# Patient Record
Sex: MALE | Race: WHITE | NOT HISPANIC OR LATINO | Employment: OTHER | URBAN - METROPOLITAN AREA
[De-identification: names, ages, dates, MRNs, and addresses within clinical notes are randomized per-mention and may not be internally consistent; named-entity substitution may affect disease eponyms.]

---

## 2017-03-22 ENCOUNTER — ALLSCRIPTS OFFICE VISIT (OUTPATIENT)
Dept: OTHER | Facility: OTHER | Age: 32
End: 2017-03-22

## 2017-03-22 DIAGNOSIS — T14.8XXA OTHER INJURY OF UNSPECIFIED BODY REGION: ICD-10-CM

## 2017-03-22 DIAGNOSIS — R53.83 OTHER FATIGUE: ICD-10-CM

## 2017-03-30 ENCOUNTER — LAB CONVERSION - ENCOUNTER (OUTPATIENT)
Dept: OTHER | Facility: OTHER | Age: 32
End: 2017-03-30

## 2017-03-30 ENCOUNTER — ALLSCRIPTS OFFICE VISIT (OUTPATIENT)
Dept: OTHER | Facility: OTHER | Age: 32
End: 2017-03-30

## 2017-03-30 LAB
25(OH)D3 SERPL-MCNC: 25 NG/ML (ref 30–100)
A/G RATIO (HISTORICAL): 1.1 (CALC) (ref 1–2.5)
ALBUMIN SERPL BCP-MCNC: 3.5 G/DL (ref 3.6–5.1)
ALP SERPL-CCNC: 50 U/L (ref 40–115)
ALT SERPL W P-5'-P-CCNC: 28 U/L (ref 9–46)
AST SERPL W P-5'-P-CCNC: 24 U/L (ref 10–40)
BASOPHILS # BLD AUTO: 0.2 %
BASOPHILS # BLD AUTO: 9 CELLS/UL (ref 0–200)
BILIRUB SERPL-MCNC: 0.4 MG/DL (ref 0.2–1.2)
BUN SERPL-MCNC: 21 MG/DL (ref 7–25)
BUN/CREA RATIO (HISTORICAL): 15 (CALC) (ref 6–22)
CALCIUM SERPL-MCNC: 8.4 MG/DL (ref 8.6–10.3)
CHLORIDE SERPL-SCNC: 101 MMOL/L (ref 98–110)
CK SERPL-CCNC: 95 U/L (ref 44–196)
CO2 SERPL-SCNC: 29 MMOL/L (ref 20–31)
CREAT SERPL-MCNC: 1.4 MG/DL (ref 0.6–1.35)
DEPRECATED RDW RBC AUTO: 14.9 % (ref 11–15)
EGFR AFRICAN AMERICAN (HISTORICAL): 76 ML/MIN/1.73M2
EGFR-AMERICAN CALC (HISTORICAL): 66 ML/MIN/1.73M2
EOSINOPHIL # BLD AUTO: 1.5 %
EOSINOPHIL # BLD AUTO: 68 CELLS/UL (ref 15–500)
GAMMA GLOBULIN (HISTORICAL): 3.1 G/DL (CALC) (ref 1.9–3.7)
GLUCOSE (HISTORICAL): 82 MG/DL (ref 65–99)
HCT VFR BLD AUTO: 53.8 % (ref 38.5–50)
HGB BLD-MCNC: 17.4 G/DL (ref 13.2–17.1)
LYMPHOCYTES # BLD AUTO: 1553 CELLS/UL (ref 850–3900)
LYMPHOCYTES # BLD AUTO: 34.5 %
MCH RBC QN AUTO: 31.4 PG (ref 27–33)
MCHC RBC AUTO-ENTMCNC: 32.3 G/DL (ref 32–36)
MCV RBC AUTO: 97.1 FL (ref 80–100)
MONOCYTES # BLD AUTO: 446 CELLS/UL (ref 200–950)
MONOCYTES (HISTORICAL): 9.9 %
NEUTROPHILS # BLD AUTO: 2426 CELLS/UL (ref 1500–7800)
NEUTROPHILS # BLD AUTO: 53.9 %
PLATELET # BLD AUTO: 250 THOUSAND/UL (ref 140–400)
PMV BLD AUTO: 8.1 FL (ref 7.5–12.5)
POTASSIUM SERPL-SCNC: 4.4 MMOL/L (ref 3.5–5.3)
RBC # BLD AUTO: 5.55 MILLION/UL (ref 4.2–5.8)
SODIUM SERPL-SCNC: 138 MMOL/L (ref 135–146)
TOTAL PROTEIN (HISTORICAL): 6.6 G/DL (ref 6.1–8.1)
TSH SERPL DL<=0.05 MIU/L-ACNC: 4.05 MIU/L (ref 0.4–4.5)
WBC # BLD AUTO: 4.5 THOUSAND/UL (ref 3.8–10.8)

## 2017-05-04 LAB
BUN SERPL-MCNC: 14 MG/DL (ref 7–25)
BUN/CREA RATIO (HISTORICAL): NORMAL (CALC) (ref 6–22)
CALCIUM SERPL-MCNC: 8.6 MG/DL (ref 8.6–10.3)
CHLORIDE SERPL-SCNC: 101 MMOL/L (ref 98–110)
CO2 SERPL-SCNC: 30 MMOL/L (ref 20–31)
CREAT SERPL-MCNC: 1.31 MG/DL (ref 0.6–1.35)
EGFR AFRICAN AMERICAN (HISTORICAL): 82 ML/MIN/1.73M2
EGFR-AMERICAN CALC (HISTORICAL): 71 ML/MIN/1.73M2
GLUCOSE (HISTORICAL): 79 MG/DL (ref 65–99)
POTASSIUM SERPL-SCNC: 4.3 MMOL/L (ref 3.5–5.3)
SODIUM SERPL-SCNC: 138 MMOL/L (ref 135–146)

## 2017-05-05 ENCOUNTER — LAB CONVERSION - ENCOUNTER (OUTPATIENT)
Dept: OTHER | Facility: OTHER | Age: 32
End: 2017-05-05

## 2017-05-05 LAB
25(OH)D3 SERPL-MCNC: 23 NG/ML (ref 30–100)
DEPRECATED RDW RBC AUTO: 14.7 % (ref 11–15)
HCT VFR BLD AUTO: 52 % (ref 38.5–50)
HGB BLD-MCNC: 17.7 G/DL (ref 13.2–17.1)
MCH RBC QN AUTO: 32 PG (ref 27–33)
MCHC RBC AUTO-ENTMCNC: 34 G/DL (ref 32–36)
MCV RBC AUTO: 94 FL (ref 80–100)
PLATELET # BLD AUTO: 250 THOUSAND/UL (ref 140–400)
PMV BLD AUTO: 8.5 FL (ref 7.5–12.5)
RBC # BLD AUTO: 5.53 MILLION/UL (ref 4.2–5.8)
WBC # BLD AUTO: 5 THOUSAND/UL (ref 3.8–10.8)

## 2017-05-08 LAB — ERYTHROPOIETIN (HISTORICAL): 8.8 MIU/ML (ref 2.6–18.5)

## 2017-05-19 ENCOUNTER — ALLSCRIPTS OFFICE VISIT (OUTPATIENT)
Dept: OTHER | Facility: OTHER | Age: 32
End: 2017-05-19

## 2017-06-12 ENCOUNTER — GENERIC CONVERSION - ENCOUNTER (OUTPATIENT)
Dept: OTHER | Facility: OTHER | Age: 32
End: 2017-06-12

## 2017-06-12 ENCOUNTER — ALLSCRIPTS OFFICE VISIT (OUTPATIENT)
Dept: OTHER | Facility: OTHER | Age: 32
End: 2017-06-12

## 2017-06-12 DIAGNOSIS — D75.1 SECONDARY POLYCYTHEMIA: ICD-10-CM

## 2017-06-17 LAB
CHOLEST SERPL-MCNC: 305 MG/DL (ref 125–200)
CHOLEST/HDLC SERPL: 6.8 (CALC)
HDLC SERPL-MCNC: 45 MG/DL
LDL CHOLESTEROL (HISTORICAL): 205 MG/DL (CALC)
NON-HDL-CHOL (CHOL-HDL) (HISTORICAL): 260 MG/DL (CALC)
TRIGL SERPL-MCNC: 273 MG/DL

## 2017-06-20 ENCOUNTER — LAB CONVERSION - ENCOUNTER (OUTPATIENT)
Dept: OTHER | Facility: OTHER | Age: 32
End: 2017-06-20

## 2017-06-20 LAB — METHYLMALONIC ACID (HISTORICAL): 281 NMOL/L (ref 87–318)

## 2017-06-21 ENCOUNTER — LAB CONVERSION - ENCOUNTER (OUTPATIENT)
Dept: OTHER | Facility: OTHER | Age: 32
End: 2017-06-21

## 2017-06-21 LAB
INTERPRETATION (HISTORICAL): NORMAL
INTERPRETATION (HISTORICAL): NORMAL
JAK2 EXON 12 MUTATION (HISTORICAL): NOT DETECTED
JAK2 EXON 13 MUTATION (HISTORICAL): NOT DETECTED
JAK2 V617F (HISTORICAL): NOT DETECTED
METHYLMALONIC ACID (HISTORICAL): 281 NMOL/L (ref 87–318)
SOURCE (HISTORICAL): NORMAL

## 2017-06-23 ENCOUNTER — LAB CONVERSION - ENCOUNTER (OUTPATIENT)
Dept: OTHER | Facility: OTHER | Age: 32
End: 2017-06-23

## 2017-06-23 LAB
A/G RATIO (HISTORICAL): 1.2 (CALC) (ref 1–2.5)
ALBUMIN SERPL BCP-MCNC: 3.4 G/DL (ref 3.6–5.1)
ALP SERPL-CCNC: 45 U/L (ref 40–115)
ALT SERPL W P-5'-P-CCNC: 18 U/L (ref 9–46)
AST SERPL W P-5'-P-CCNC: 17 U/L (ref 10–40)
BASOPHILS # BLD AUTO: 0.5 %
BASOPHILS # BLD AUTO: 28 CELLS/UL (ref 0–200)
BILIRUB SERPL-MCNC: 0.4 MG/DL (ref 0.2–1.2)
BUN SERPL-MCNC: 16 MG/DL (ref 7–25)
BUN/CREA RATIO (HISTORICAL): ABNORMAL (CALC) (ref 6–22)
CALCIUM SERPL-MCNC: 8.4 MG/DL (ref 8.6–10.3)
CHLORIDE SERPL-SCNC: 103 MMOL/L (ref 98–110)
CO2 SERPL-SCNC: 29 MMOL/L (ref 20–31)
CREAT SERPL-MCNC: 1.29 MG/DL (ref 0.6–1.35)
DEPRECATED RDW RBC AUTO: 15.1 % (ref 11–15)
EGFR AFRICAN AMERICAN (HISTORICAL): 84 ML/MIN/1.73M2
EGFR-AMERICAN CALC (HISTORICAL): 72 ML/MIN/1.73M2
EOSINOPHIL # BLD AUTO: 1.8 %
EOSINOPHIL # BLD AUTO: 99 CELLS/UL (ref 15–500)
GAMMA GLOBULIN (HISTORICAL): 2.9 G/DL (CALC) (ref 1.9–3.7)
GLUCOSE (HISTORICAL): 82 MG/DL (ref 65–99)
HCT VFR BLD AUTO: 53.3 % (ref 38.5–50)
HGB BLD-MCNC: 17 G/DL (ref 13.2–17.1)
INTERPRETATION (HISTORICAL): NORMAL
INTERPRETATION (HISTORICAL): NORMAL
JAK2 EXON 12 MUTATION (HISTORICAL): NOT DETECTED
JAK2 EXON 13 MUTATION (HISTORICAL): NOT DETECTED
JAK2 V617F (HISTORICAL): NOT DETECTED
LYMPHOCYTES # BLD AUTO: 1480 CELLS/UL (ref 850–3900)
LYMPHOCYTES # BLD AUTO: 26.9 %
Lab: NOT DETECTED
Lab: NOT DETECTED
MCH RBC QN AUTO: 31.8 PG (ref 27–33)
MCHC RBC AUTO-ENTMCNC: 31.9 G/DL (ref 32–36)
MCV RBC AUTO: 99.5 FL (ref 80–100)
METHYLMALONIC ACID (HISTORICAL): 281 NMOL/L (ref 87–318)
MONOCYTES # BLD AUTO: 567 CELLS/UL (ref 200–950)
MONOCYTES (HISTORICAL): 10.3 %
NEUTROPHILS # BLD AUTO: 3328 CELLS/UL (ref 1500–7800)
NEUTROPHILS # BLD AUTO: 60.5 %
PLATELET # BLD AUTO: 245 THOUSAND/UL (ref 140–400)
PMV BLD AUTO: 8.3 FL (ref 7.5–12.5)
POTASSIUM SERPL-SCNC: 4.4 MMOL/L (ref 3.5–5.3)
RBC # BLD AUTO: 5.36 MILLION/UL (ref 4.2–5.8)
REVIEWED BY (HISTORICAL): NORMAL
SODIUM SERPL-SCNC: 139 MMOL/L (ref 135–146)
SOURCE (HISTORICAL): NORMAL
SOURCE (HISTORICAL): NORMAL
TOTAL PROTEIN (HISTORICAL): 6.3 G/DL (ref 6.1–8.1)
WBC # BLD AUTO: 5.5 THOUSAND/UL (ref 3.8–10.8)

## 2017-06-27 ENCOUNTER — HOSPITAL ENCOUNTER (OUTPATIENT)
Dept: ULTRASOUND IMAGING | Facility: HOSPITAL | Age: 32
Discharge: HOME | End: 2017-06-27
Payer: COMMERCIAL

## 2017-06-27 PROCEDURE — 76700 US EXAM ABDOM COMPLETE: CPT

## 2017-07-11 ENCOUNTER — ALLSCRIPTS OFFICE VISIT (OUTPATIENT)
Dept: OTHER | Facility: OTHER | Age: 32
End: 2017-07-11

## 2017-07-13 ENCOUNTER — TRANSCRIBE ORDERS (OUTPATIENT)
Dept: ADMINISTRATIVE | Facility: HOSPITAL | Age: 32
End: 2017-07-13

## 2017-07-13 ENCOUNTER — ALLSCRIPTS OFFICE VISIT (OUTPATIENT)
Dept: OTHER | Facility: OTHER | Age: 32
End: 2017-07-13

## 2017-07-13 DIAGNOSIS — R41.89 OTHER SYMPTOMS AND SIGNS INVOLVING COGNITIVE FUNCTIONS AND AWARENESS: ICD-10-CM

## 2017-07-13 DIAGNOSIS — R41.89 AKINETIC MUTISM: Primary | ICD-10-CM

## 2017-08-02 ENCOUNTER — HOSPITAL ENCOUNTER (OUTPATIENT)
Dept: MRI IMAGING | Facility: HOSPITAL | Age: 32
Discharge: HOME | End: 2017-08-02
Attending: PSYCHIATRY & NEUROLOGY
Payer: COMMERCIAL

## 2017-08-02 DIAGNOSIS — R41.89 AKINETIC MUTISM: ICD-10-CM

## 2017-08-02 PROCEDURE — 70551 MRI BRAIN STEM W/O DYE: CPT

## 2017-08-03 ENCOUNTER — GENERIC CONVERSION - ENCOUNTER (OUTPATIENT)
Dept: OTHER | Facility: OTHER | Age: 32
End: 2017-08-03

## 2017-08-03 ENCOUNTER — LAB CONVERSION - ENCOUNTER (OUTPATIENT)
Dept: OTHER | Facility: OTHER | Age: 32
End: 2017-08-03

## 2017-08-03 LAB
FOLATE SERPL-MCNC: 20.1 NG/ML
RPR SCREEN (HISTORICAL): NORMAL
T4 FREE SERPL-MCNC: 1.2 NG/DL (ref 0.8–1.8)
TSH SERPL DL<=0.05 MIU/L-ACNC: 2.25 MIU/L (ref 0.4–4.5)
VIT B12 SERPL-MCNC: 420 PG/ML (ref 200–1100)

## 2017-08-22 ENCOUNTER — ALLSCRIPTS OFFICE VISIT (OUTPATIENT)
Dept: OTHER | Facility: OTHER | Age: 32
End: 2017-08-22

## 2017-08-24 ENCOUNTER — ALLSCRIPTS OFFICE VISIT (OUTPATIENT)
Dept: OTHER | Facility: OTHER | Age: 32
End: 2017-08-24

## 2017-08-25 ENCOUNTER — GENERIC CONVERSION - ENCOUNTER (OUTPATIENT)
Dept: OTHER | Facility: OTHER | Age: 32
End: 2017-08-25

## 2017-11-07 ENCOUNTER — ALLSCRIPTS OFFICE VISIT (OUTPATIENT)
Dept: OTHER | Facility: OTHER | Age: 32
End: 2017-11-07

## 2017-12-12 ENCOUNTER — GENERIC CONVERSION - ENCOUNTER (OUTPATIENT)
Dept: OTHER | Facility: OTHER | Age: 32
End: 2017-12-12

## 2018-01-10 NOTE — RESULT NOTES
Verified Results  * MRI BRAIN WO CONTRAST [id number] 04:30PM Jose Tapia     Test Name Result Flag Reference   MRI BRAIN WO CONTRAST (Report)     This is a summary report  The complete report is available in the patient's medical record  If you cannot access the medical record, please contact the sending organization for a detailed fax or copy  MRI BRAIN WITHOUT CONTRAST, [facility]     INDICATION: History of Down syndrome  Memory loss  Early signs of dementia  COMPARISON:  None  TECHNIQUE: Sagittal T1, axial T2, axial [place], axial T1, axial FLAIR, axial diffusion imaging  Coronal T2  Sagittal 3D volumetric imaging processed by [facility] Related Atrophy reports  IMAGE QUALITY: Diagnostic  FINDINGS:     BRAIN PARENCHYMA: No mass, mass effect or midline shift  White matter hyperintensities are seen within the frontal lobes, right greater than left on T2 and FLAIR imaging  No associated diffusion abnormality, mass or hemorrhage  Normal basilar    cisterns  Brainstem and cerebellum demonstrate normal signal       QUANTITATIVE:      Exam Quality: Adequate for volumetric analysis  Hippocampus     Total hippocampal volume (cc):  7 69    Percentile for similar age:   81th      Lateral ventricular volume (cc):   26 52    Percentile for similar age:   82th      Inferior lateral vent volume (cc):  1 75    Percentile for similar age:   52th        Quantitative conclusions:      Hippocampi:             Normal volume      Lateral Ventricle Volume:   Normal Volume     Temporal Horn Volume:    Normal Volume     Concordance between qualitative and quantitative hippocampal volume assessment: Concordant  Change in brain volumes: No previous volumetric study for comparison     Mean hippocampal volume loss among normal elderly: 0 7% per year, (-0 3 to 1 7; Hollie 2008; also Emeka 2010)           VENTRICLES: The ventricles are normal in size and contour  SELLA AND PITUITARY GLAND: Normal      ORBITS: Normal      PARANASAL SINUSES: Mild mucosal thickening of the paranasal sinuses  VASCULATURE: Evaluation of the major intracranial vasculature demonstrates appropriate flow voids  CALVARIUM AND SKULL BASE: Normal      EXTRACRANIAL SOFT TISSUES: Normal            IMPRESSION:     1  White matter changes within the cerebral hemispheres, right greater than left may represent chronic microangiopathic change, somewhat atypical for a patient of this age  No acute ischemia mass or hemorrhage  2  NeuroQuant analysis was performed: Normal study; Does not support neurodegeneration         Workstation performed: [id number]     Signed by:   Sonny Amaya DO   8/3/17     (1) T4, FREE [id number] 09:21AM [facility]     Test Name Result Flag Reference   T4, FREE 1 2 ng/dL  0 8-1 8     (Q) TSH, 3RD GENERATION [id number] 09:21AM [facility]     Test Name Result Flag Reference   TSH 2 25 mIU/L  0 40-4 50     (Q) VITAMIN B12/FOLATE, SERUM PANEL [id number] 09:21AM [facility]   REPORT COMMENT:  FASTING:YES     Test Name Result Flag Reference   VITAMIN B12 420 pg/mL  200-1100   FOLATE, SERUM 20 1 ng/mL     Reference Range                             Low:           <3 4                             Borderline:    3 4-5 4                             Normal:        >5 4

## 2018-01-12 VITALS
HEIGHT: 61 IN | TEMPERATURE: 97 F | DIASTOLIC BLOOD PRESSURE: 62 MMHG | WEIGHT: 206.5 LBS | RESPIRATION RATE: 16 BRPM | HEART RATE: 77 BPM | SYSTOLIC BLOOD PRESSURE: 112 MMHG | BODY MASS INDEX: 38.99 KG/M2 | OXYGEN SATURATION: 97 %

## 2018-01-12 NOTE — PROGRESS NOTES
Assessment    1  Encounter for preventive health examination (V70 0) (Z00 00)    Discussion/Summary  Impression: health maintenance visit  Currently, he eats a healthy diet and has an adequate exercise regimen  Patient discussion: discussed with the patient  34 yo M who is doing well  Immunizations UTD  Vitals stable  Forms completed  RTC in 6-12 months  The patient, patient's caretaker was counseled regarding impressions  Chief Complaint  No complaints  HM visit  History of Present Illness  HM, Adult Male: The patient is being seen for a health maintenance evaluation  The last health maintenance visit was 1 year(s) ago  General Health: The patient's health since the last visit is described as good  He has regular dental visits  He complains of vision problems  Vision care includes wearing glasses  He denies hearing loss  Immunizations status: up to date  Lifestyle:  He consumes a diverse and healthy diet  He has weight concerns  He exercises regularly (treadmill)  He exercises 3 or more times per week  Exercise includes walking and running/jogging  He does not use tobacco  He consumes alcohol  He reports occasional alcohol use  He denies drug use  Reproductive health:  the patient is not sexually active  Screening: cancer screening reviewed and current  Colorectal cancer screening includes no previous screening  metabolic screening reviewed and current  Metabolic screening includes lipid profile performed within the past five years, glucose screening performed last year and thyroid function test performed last year  risk screening reviewed and current  Cardiovascular risk factors: high LDL cholesterol and obesity, but no hypertension, no diabetes, no tobacco use, no illicit drug use and no sedentary lifestyle  Safety elements used: seat belt, smoke detector, carbon monoxide detector and CPR training for household members  Risk findings: no passive smoke exposure     HPI: 34 yo M with cognitive impairment who presents for  with caretaker  Denies any concerns  Has recently been evaluated by the Neurologist for possible early onset Alzheimers and by the Hematologist for polycythemia likely secondary to his snoring  No HA/CP/SOB/Abd pain/N/V/D  Review of Systems    Constitutional: no fever, not feeling poorly and no chills  ENT: no earache  Cardiovascular: no chest pain  Respiratory: no shortness of breath and no cough  Gastrointestinal: no abdominal pain, no nausea, no vomiting and no blood in stools  Genitourinary: no dysuria  Integumentary: no dry skin  Neurological: no headache  Psychiatric: not suicidal, no anxiety and no depression  Active Problems     1  Alzheimer's disease, early onset (331 0) (G30 0,F02 80)   2  Dry skin dermatitis (692 89) (L85 3)   3  GERD without esophagitis (530 81) (K21 9)   4  HLD (hyperlipidemia) (272 4) (E78 5)   5  Intellectual functioning disability (319) (F78)   6  Muscle strain (848 9) (T14 8)   7  Need for Tdap vaccination (V06 1) (Z23)   8  Obesity (278 00) (E66 9)   9  Pain in both lower extremities (729 5) (M79 604,M79 605)   10  Secondary polycythemia (289 0) (D75 1)   11   Vitamin D deficiency (268 9) (E55 9)    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)       Moderate intellectual disabilities (318 0) (F71)       Cognitive impairment (294 9) (R41 89)          Past Medical History    · History of Acute bacterial rhinosinusitis (461 9) (J01 90,B96 89)   · Denied: History of Atlanto-axial instability   · History of allergic rhinitis (V12 69) (Z87 09)   · History of fatigue (V13 89) (Z87 898)   · History of folliculitis (V84 1) (L01 2)   · History of pneumonia (V12 61) (Z87 01)    Family History  Mother    · Family history of Early onset Alzheimer's dementia without behavioral disturbance  Father    · Family history of diabetes mellitus (V18 0) (Z83 3)   · Family history of essential hypertension (V17 49) (Z82 49)    Social History    · Does not use illicit drugs (Z94 56) (Z78 9)   · Never a smoker   · No alcohol use   · On disability    Current Meds   1  Atorvastatin Calcium 40 MG Oral Tablet; TAKE 1 TABLET AT BEDTIME; Therapy: 26MHB6724 to (Evaluate:95Erm4373)  Requested for: 20Jun2017; Last   Rx:20Jun2017 Ordered   2  LORazepam 1 MG Oral Tablet; TAKE 1 TABLET 1 HOUR BEFORE MRI  MAY REPEAT   ONCE 15 MINUTES BEFORE MRI; Therapy: 78LHS2498 to (Evaluate:14Jul2017); Last Rx:13Jul2017 Ordered   3  Vitamin D (Ergocalciferol) 30838 UNIT Oral Capsule; TAKE 1 CAPSULE WEEKLY; Therapy: 14NMY4033 to (Last Rx:04Kda3108)  Requested for: 04VDU7613 Ordered   4  ZyrTEC Allergy 10 MG Oral Tablet; TAKE 1 TABLET AT BEDTIME; Therapy: 64HYU9663 to (Evaluate:19Jan2017)  Requested for: 96Kjz7391; Last   Rx:91Sle6623 Ordered    Allergies    1  No Known Drug Allergies    2  Seasonal    Vitals   Recorded: 22Aug2017 01:16PM   Temperature 97 6 F, Tympanic   Heart Rate 80   Respiration 14   Systolic 157, LUE, Sitting   Diastolic 80, LUE, Sitting   BP CUFF SIZE Large   Height 5 ft 1 8 in   Weight 206 lb 2 oz   BMI Calculated 37 95   BSA Calculated 1 93   Pain Scale 0     Physical Exam    Constitutional   General appearance: No acute distress, well appearing and well nourished  Head and Face   Head and face: Normal     Eyes   Conjunctiva and lids: No erythema, swelling or discharge  Pupils and irises: Equal, round, reactive to light  Ears, Nose, Mouth, and Throat   External inspection of ears and nose: Normal     Otoscopic examination: Tympanic membranes translucent with normal light reflex  Canals patent without erythema  Hearing: Normal     Lips, teeth, and gums: Normal, good dentition  Oropharynx: Normal with no erythema, edema, exudate or lesions  Neck   Neck: Supple, symmetric, trachea midline, no masses  Thyroid: Normal, no thyromegaly      Pulmonary   Respiratory effort: No increased work of breathing or signs of respiratory distress  Auscultation of lungs: Clear to auscultation  Cardiovascular   Auscultation of heart: Normal rate and rhythm, normal S1 and S2, no murmurs  Abdomen   Abdomen: Non-tender, no masses  Liver and spleen: No hepatomegaly or splenomegaly  Lymphatic   Palpation of lymph nodes in neck: No lymphadenopathy  Musculoskeletal   Gait and station: Normal     Stability: Normal     Muscle strength/tone: Normal     Psychiatric   Judgment and insight: Normal     Orientation to person, place and time: Normal     Recent and remote memory: Intact  Mood and affect: Normal        Attending Note  Attending Note: Attending Note: I discussed the case with the Resident and reviewed the Resident's note  Level of Participation: I was present in clinic, but did not examine the patient  I agree with the Resident's note except Down syndrome/ID  Future Appointments    Date/Time Provider Specialty Site   11/07/2017 03:20 PM JULIO CESAR Mazariegos  [address] [place] [address]   08/23/2018 04:00 PM JULIO CESAR Mcgraw   [facility]     Signatures   Electronically signed by : JULIO CESAR Grier ; Aug 22 2017  2:45PM EST                       (Author)    Electronically signed by : JULIO CESAR Carty ; Aug 28 2017  3:08PM EST                       (Review)

## 2018-01-13 VITALS
HEIGHT: 62 IN | HEART RATE: 80 BPM | BODY MASS INDEX: 37.93 KG/M2 | SYSTOLIC BLOOD PRESSURE: 124 MMHG | WEIGHT: 206.13 LBS | DIASTOLIC BLOOD PRESSURE: 80 MMHG | RESPIRATION RATE: 14 BRPM | TEMPERATURE: 97.6 F

## 2018-01-13 VITALS
DIASTOLIC BLOOD PRESSURE: 80 MMHG | HEIGHT: 61 IN | SYSTOLIC BLOOD PRESSURE: 124 MMHG | WEIGHT: 200.13 LBS | HEART RATE: 80 BPM | RESPIRATION RATE: 16 BRPM | TEMPERATURE: 97.8 F | BODY MASS INDEX: 37.79 KG/M2

## 2018-01-13 VITALS
BODY MASS INDEX: 37.54 KG/M2 | RESPIRATION RATE: 14 BRPM | DIASTOLIC BLOOD PRESSURE: 76 MMHG | WEIGHT: 204 LBS | HEIGHT: 62 IN | SYSTOLIC BLOOD PRESSURE: 120 MMHG | HEART RATE: 78 BPM | TEMPERATURE: 98.4 F

## 2018-01-13 VITALS
RESPIRATION RATE: 18 BRPM | TEMPERATURE: 96.5 F | SYSTOLIC BLOOD PRESSURE: 140 MMHG | BODY MASS INDEX: 39.46 KG/M2 | HEART RATE: 80 BPM | HEIGHT: 61 IN | OXYGEN SATURATION: 98 % | WEIGHT: 209 LBS | DIASTOLIC BLOOD PRESSURE: 86 MMHG

## 2018-01-13 NOTE — MISCELLANEOUS
To Whom it May Concern,    Dinorah Vieira 1985 is a patient under the care of [facility]  Mr Marlene Fischer is highly functional but after neurologic evaluation and cognitive deterioration due to his underlying condition, I would recommend he attend a workshop and/or  volunteer up to 4 hours a day as tolerated  Engagement in cognitive activities will benefit him long term and help in maintaining his cognitive function  For any further questions please contact our office at [phone]  Sincerely,    JULIO CESAR Elise  Electronically signed by:Yin ROSARIO    Aug 25 2017  1:07PM EST Author

## 2018-01-14 VITALS
SYSTOLIC BLOOD PRESSURE: 142 MMHG | DIASTOLIC BLOOD PRESSURE: 88 MMHG | BODY MASS INDEX: 39.37 KG/M2 | HEART RATE: 60 BPM | RESPIRATION RATE: 16 BRPM | WEIGHT: 207 LBS

## 2018-01-15 VITALS
WEIGHT: 206 LBS | DIASTOLIC BLOOD PRESSURE: 74 MMHG | SYSTOLIC BLOOD PRESSURE: 102 MMHG | BODY MASS INDEX: 38.89 KG/M2 | HEIGHT: 61 IN | RESPIRATION RATE: 16 BRPM | TEMPERATURE: 97.9 F | HEART RATE: 72 BPM

## 2018-01-15 NOTE — RESULT NOTES
Verified Results  (1) T4, FREE 02Aug2017 09:21AM [facility]     Test Name Result Flag Reference   T4, FREE 1 2 ng/dL  0 8-1 8     (Q) TSH, 3RD GENERATION 02Aug2017 09:21AM [facility]     Test Name Result Flag Reference   TSH 2 25 mIU/L  0 40-4 50     (Q) VITAMIN B12/FOLATE, SERUM PANEL 02Aug2017 09:21AM [facility]     Test Name Result Flag Reference   VITAMIN B12 420 pg/mL  200-1100   FOLATE, SERUM 20 1 ng/mL     Reference Range                             Low:           <3 4                             Borderline:    3 4-5 4                             Normal:        >5 4     (Q) RPR (DX) W/REFL TITER AND CONFIRMATORY TESTING 02Aug2017 09:21AM [facility]   REPORT COMMENT:  FASTING:YES     Test Name Result Flag Reference   RPR (DX) W/REFL TITER AND$CONFIRMATORY TESTING NON-REACTIVE  NON-REACTIVE

## 2018-01-18 NOTE — MISCELLANEOUS
Provider Comments  Provider Comments:   pt was a no show today      Signatures   Electronically signed by : Patti Weaver, ; Nov 7 2017  3:36PM EST                       (Author)

## 2018-01-24 VITALS
BODY MASS INDEX: 39.88 KG/M2 | SYSTOLIC BLOOD PRESSURE: 118 MMHG | HEIGHT: 61 IN | HEART RATE: 74 BPM | RESPIRATION RATE: 16 BRPM | DIASTOLIC BLOOD PRESSURE: 72 MMHG | TEMPERATURE: 97.8 F | WEIGHT: 211.25 LBS

## 2018-08-21 ENCOUNTER — OFFICE VISIT (OUTPATIENT)
Dept: FAMILY MEDICINE CLINIC | Facility: CLINIC | Age: 33
End: 2018-08-21
Payer: COMMERCIAL

## 2018-08-21 VITALS
BODY MASS INDEX: 38.93 KG/M2 | SYSTOLIC BLOOD PRESSURE: 116 MMHG | WEIGHT: 206.2 LBS | TEMPERATURE: 97.2 F | RESPIRATION RATE: 16 BRPM | HEART RATE: 80 BPM | DIASTOLIC BLOOD PRESSURE: 78 MMHG | HEIGHT: 61 IN

## 2018-08-21 DIAGNOSIS — E66.9 CLASS 2 OBESITY WITHOUT SERIOUS COMORBIDITY WITH BODY MASS INDEX (BMI) OF 39.0 TO 39.9 IN ADULT, UNSPECIFIED OBESITY TYPE: ICD-10-CM

## 2018-08-21 DIAGNOSIS — E78.49 OTHER HYPERLIPIDEMIA: ICD-10-CM

## 2018-08-21 DIAGNOSIS — Z11.1 PPD SCREENING TEST: Primary | ICD-10-CM

## 2018-08-21 PROCEDURE — 3725F SCREEN DEPRESSION PERFORMED: CPT | Performed by: FAMILY MEDICINE

## 2018-08-21 PROCEDURE — 3008F BODY MASS INDEX DOCD: CPT | Performed by: FAMILY MEDICINE

## 2018-08-21 PROCEDURE — 99213 OFFICE O/P EST LOW 20 MIN: CPT | Performed by: FAMILY MEDICINE

## 2018-08-21 PROCEDURE — 86580 TB INTRADERMAL TEST: CPT | Performed by: FAMILY MEDICINE

## 2018-08-21 RX ORDER — FLUTICASONE PROPIONATE 50 MCG
1 SPRAY, SUSPENSION (ML) NASAL DAILY
COMMUNITY

## 2018-08-21 RX ORDER — ATORVASTATIN CALCIUM 40 MG/1
1 TABLET, FILM COATED ORAL
COMMUNITY
Start: 2017-06-20 | End: 2019-06-25 | Stop reason: ALTCHOICE

## 2018-08-21 NOTE — PROGRESS NOTES
Assessment/Plan:    Problem List Items Addressed This Visit        Other    Obesity     Diet and exercise discussed  Will check HgA1c, TSH         Relevant Orders    Comprehensive metabolic panel    Hemoglobin A1C    TSH, 3rd generation with Free T4 reflex    Other hyperlipidemia     Continue atorvastatin 40 mg daily           Relevant Medications    atorvastatin (LIPITOR) 40 mg tablet    Other Relevant Orders    Lipid panel      Other Visit Diagnoses     PPD screening test    -  Primary    Relevant Orders    TB Skin Test (Completed)          Subjective:      Patient ID: Dano Gatica is a 27 y o  male  58-year-old male with moderate intellectual disability,  Trisomy 24, hyperlipidemia here for  Annual exam   Per caretakers no complaints today  Taking all the medications as directed  Denies any shortness of breath, chest pain, palpitations, falls, seizures  a no family history of cancers  The following portions of the patient's history were reviewed and updated as appropriate: allergies, current medications, past family history, past medical history, past social history, past surgical history and problem list     Review of Systems   Constitutional: Negative for appetite change, fever and unexpected weight change  Eyes: Negative for visual disturbance  Respiratory: Negative for chest tightness  Cardiovascular: Negative for chest pain and palpitations  Gastrointestinal: Negative for abdominal pain and vomiting  Musculoskeletal: Negative for arthralgias  Neurological: Negative for dizziness and numbness  Hematological: Negative for adenopathy  Psychiatric/Behavioral: Negative for confusion  Objective:    Vitals:    08/21/18 1534   BP: 116/78   Pulse: 80   Resp: 16   Temp: (!) 97 2 °F (36 2 °C)        Physical Exam   Constitutional: He is oriented to person, place, and time  He appears well-developed and well-nourished  No distress  HENT:   Head: Normocephalic     Mouth/Throat: Oropharynx is clear and moist  No oropharyngeal exudate  Eyes: Conjunctivae are normal  Pupils are equal, round, and reactive to light  Neck: Normal range of motion  Cardiovascular: Normal rate, regular rhythm, normal heart sounds and intact distal pulses  Exam reveals no gallop and no friction rub  No murmur heard  Pulmonary/Chest: Effort normal and breath sounds normal  No respiratory distress  He has no wheezes  He has no rales  He exhibits no tenderness  Abdominal: Soft  He exhibits no distension and no mass  There is no tenderness  There is no rebound and no guarding  Musculoskeletal: Normal range of motion  He exhibits no edema, tenderness or deformity  Lymphadenopathy:     He has no cervical adenopathy  Neurological: He is alert and oriented to person, place, and time  Skin: Skin is warm and dry  No rash noted  He is not diaphoretic  No pallor  Psychiatric: He has a normal mood and affect  Vitals reviewed

## 2018-08-21 NOTE — ASSESSMENT & PLAN NOTE
Wt Readings from Last 3 Encounters:   08/21/18 93 5 kg (206 lb 3 2 oz)   12/12/17 95 8 kg (211 lb 4 oz)   08/22/17 93 5 kg (206 lb 2 1 oz)   Diet and exercise discussed  Will check HgA1c, TSH

## 2018-08-23 ENCOUNTER — CLINICAL SUPPORT (OUTPATIENT)
Dept: FAMILY MEDICINE CLINIC | Facility: CLINIC | Age: 33
End: 2018-08-23

## 2018-08-23 ENCOUNTER — OFFICE VISIT (OUTPATIENT)
Dept: NEUROLOGY | Facility: CLINIC | Age: 33
End: 2018-08-23
Payer: COMMERCIAL

## 2018-08-23 VITALS
WEIGHT: 203 LBS | RESPIRATION RATE: 14 BRPM | SYSTOLIC BLOOD PRESSURE: 112 MMHG | BODY MASS INDEX: 38.48 KG/M2 | HEART RATE: 74 BPM | DIASTOLIC BLOOD PRESSURE: 72 MMHG

## 2018-08-23 DIAGNOSIS — F79 INTELLECTUAL FUNCTIONING DISABILITY: Primary | ICD-10-CM

## 2018-08-23 DIAGNOSIS — Z11.1 PPD SCREENING TEST: Primary | ICD-10-CM

## 2018-08-23 DIAGNOSIS — Q90.9 COMPLETE TRISOMY 21 SYNDROME: ICD-10-CM

## 2018-08-23 LAB
INDURATION: 0 MM
TB SKIN TEST: NEGATIVE

## 2018-08-23 PROCEDURE — 99213 OFFICE O/P EST LOW 20 MIN: CPT | Performed by: PSYCHIATRY & NEUROLOGY

## 2018-08-23 NOTE — PROGRESS NOTES
Patient ID: Fran Moreira is a 27 y o  male  Assessment/Plan:     Problem List Items Addressed This Visit        Other    Complete trisomy 21 syndrome    Intellectual functioning disability - Primary         Mr  Jaskaran Lopez has presented for follow up on intellectual and cognitive dysfunction with no new focal neurologic deficit has been described  Patient has no progression of his cognitive difficulties weakness - we repeated MMSE and he scored 23/30 with 3/3 words recalled  Patient has been socially engaged and he is to start a new job soon with [employer] has received already  We discussed that patient would benefit from more social activities and he has no deterioration in his cognitive function  May consider cognitive therapy if patient wishes, but no deterioration noted  No new focal neurologic deficit has been described  Follow up on annual matter  Subjective: cognitive dysfunction    HPI/History of Present Illness  Mr  Jaskaran Lopez has a history of complete trisomy 21 syndrome, moderate intellectual disability, HLD, Obesity,  secondary polycythemia, vitamin D deficiency, who presented with his caregiver for follow up on cognitive dysfunction  Patient  has been socially active, he completed reading his book and he is planning to start a new one  Patient caregiver looking the job for [employer]; No new weakness or numbness, no falls, no infections, no balance issues described since last office visit  No lower back or neck pain,  no headaches  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; he is a good , and now 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  MRI brain 8/02/2017: White matter changes within the cerebral hemispheres, right greater than left may represent chronic microangiopathic change, somewhat atypical for a patient of this age  No acute ischemia mass or hemorrhage  NeuroQuant analysis was performed: Normal study;  Does not support neurodegeneration        The following portions of the patient's history were reviewed and updated as appropriate:   He  has a past medical history of Asthma and Spinal pain  He   Patient Active Problem List    Diagnosis Date Noted    Other hyperlipidemia 08/21/2018    Intellectual functioning disability 09/21/2016    Complete trisomy 21 syndrome 03/10/2016    Obesity 03/10/2016     He  has no past surgical history on file  His family history is not on file  He  reports that he has never smoked  He has never used smokeless tobacco  He reports that he drinks alcohol  He reports that he does not use drugs  Current Outpatient Prescriptions   Medication Sig Dispense Refill    atorvastatin (LIPITOR) 40 mg tablet Take 1 tablet by mouth      Cetirizine HCl (ZYRTEC ALLERGY) 10 MG CAPS Take 1 tablet by mouth      fluticasone (FLONASE) 50 mcg/act nasal spray 1 spray into each nostril daily      ibuprofen (MOTRIN) 600 mg tablet Take 1 tablet (600 mg total) by mouth every 6 (six) hours as needed for mild pain  30 tablet 0     No current facility-administered medications for this visit  Current Outpatient Prescriptions on File Prior to Visit   Medication Sig    atorvastatin (LIPITOR) 40 mg tablet Take 1 tablet by mouth    Cetirizine HCl (ZYRTEC ALLERGY) 10 MG CAPS Take 1 tablet by mouth    fluticasone (FLONASE) 50 mcg/act nasal spray 1 spray into each nostril daily    ibuprofen (MOTRIN) 600 mg tablet Take 1 tablet (600 mg total) by mouth every 6 (six) hours as needed for mild pain  No current facility-administered medications on file prior to visit  He has No Known Allergies            Objective:    Blood pressure 112/72, pulse 74, resp  rate 14, weight 92 1 kg (203 lb)  Physical Exam/Neurological Exam  CONSTITUTIONAL: NAD, pleasant  NECK: supple, no lymphadenopathy, no thyromegaly, no JVD  CARDIOVASCULAR: RRR, normal S1S2, no murmurs, no rubs  RESP: clear to auscultation bilaterally, no wheezes/rhonchi/rales   ABDOMEN: soft, non tender, non distended  SKIN: no rash or skin lesions  EXTREMITIES: no edema, pulses 2+bilaterally  PSYCH: appropriate mood and affect  NEUROLOGIC COMPREHENSIVE EXAM: Patient is oriented to person, place and time, NAD; appropriate affect  CN II, III, IV, V, VI, VII,VIII,IX,X,XI-XII intact with EOMI, low set ears, left eye ambliopia; PERRLA, OKN intact, VF grossly intact, fundi poorly visualized secondary to pupillary constriction; symmetric face noted  Motor: 5/5 UE/LE bilateral symmetric; Sensory: intact to light touch and pinprick bilaterally; normal vibration sensation feet bilaterally; Coordination within normal limits on FTN and RACQUEL testing; DTR: 2/4 through, no Babinski, no clonus  Tandem gait is Abnormal  Romberg: negative  ROS:  12 points of review of system was reviewed with the patient and was unremarkable with exception: see HPI  Review of Systems   Constitutional: Negative  HENT: Negative  Eyes: Positive for redness  Respiratory: Negative  Cardiovascular: Negative  Gastrointestinal: Negative  Endocrine: Negative  Genitourinary: Negative  Musculoskeletal: Negative  Skin: Negative  Allergic/Immunologic: Negative  Neurological: Negative  Hematological: Negative  Psychiatric/Behavioral: Negative

## 2018-08-25 ENCOUNTER — TRANSCRIBE ORDERS (OUTPATIENT)
Dept: LAB | Facility: CLINIC | Age: 33
End: 2018-08-25

## 2018-08-27 LAB
ALBUMIN SERPL-MCNC: 3.8 G/DL (ref 3.5–5.5)
ALBUMIN/GLOB SERPL: 1.3 {RATIO} (ref 1.2–2.2)
ALP SERPL-CCNC: 71 IU/L (ref 39–117)
ALT SERPL-CCNC: 25 IU/L (ref 0–44)
AMBIG ABBREV DEFAULT: NORMAL
AMBIG ABBREV DEFAULT: NORMAL
AST SERPL-CCNC: 21 IU/L (ref 0–40)
BILIRUB SERPL-MCNC: 0.6 MG/DL (ref 0–1.2)
BUN SERPL-MCNC: 16 MG/DL (ref 6–20)
BUN/CREAT SERPL: 12 (ref 9–20)
CALCIUM SERPL-MCNC: 8.7 MG/DL (ref 8.7–10.2)
CHLORIDE SERPL-SCNC: 97 MMOL/L (ref 96–106)
CHOLEST SERPL-MCNC: 172 MG/DL (ref 100–199)
CO2 SERPL-SCNC: 24 MMOL/L (ref 20–29)
CREAT SERPL-MCNC: 1.33 MG/DL (ref 0.76–1.27)
GLOBULIN SER-MCNC: 2.9 G/DL (ref 1.5–4.5)
GLUCOSE SERPL-MCNC: 89 MG/DL (ref 65–99)
HBA1C MFR BLD: 5.6 % (ref 4.8–5.6)
HDLC SERPL-MCNC: 36 MG/DL
LDLC SERPL CALC-MCNC: 106 MG/DL (ref 0–99)
POTASSIUM SERPL-SCNC: 4.7 MMOL/L (ref 3.5–5.2)
PROT SERPL-MCNC: 6.7 G/DL (ref 6–8.5)
SL AMB EGFR AFRICAN AMERICAN: 80 ML/MIN/1.73
SL AMB EGFR NON AFRICAN AMERICAN: 69 ML/MIN/1.73
SL AMB VLDL CHOLESTEROL CALC: 30 MG/DL (ref 5–40)
SODIUM SERPL-SCNC: 139 MMOL/L (ref 134–144)
TRIGL SERPL-MCNC: 150 MG/DL (ref 0–149)
TSH SERPL DL<=0.005 MIU/L-ACNC: 2.81 UIU/ML (ref 0.45–4.5)

## 2019-02-08 ENCOUNTER — OFFICE VISIT (OUTPATIENT)
Dept: FAMILY MEDICINE CLINIC | Facility: CLINIC | Age: 34
End: 2019-02-08

## 2019-02-08 VITALS
WEIGHT: 196.4 LBS | TEMPERATURE: 97.7 F | DIASTOLIC BLOOD PRESSURE: 78 MMHG | HEIGHT: 62 IN | RESPIRATION RATE: 16 BRPM | SYSTOLIC BLOOD PRESSURE: 110 MMHG | HEART RATE: 76 BPM | BODY MASS INDEX: 36.14 KG/M2

## 2019-02-08 DIAGNOSIS — E78.49 OTHER HYPERLIPIDEMIA: ICD-10-CM

## 2019-02-08 DIAGNOSIS — Z00.00 HEALTHCARE MAINTENANCE: Primary | ICD-10-CM

## 2019-02-08 DIAGNOSIS — Z23 ENCOUNTER FOR IMMUNIZATION: ICD-10-CM

## 2019-02-08 PROBLEM — J30.2 ACUTE SEASONAL ALLERGIC RHINITIS, UNSPECIFIED TRIGGER: Status: ACTIVE | Noted: 2017-12-12

## 2019-02-08 PROCEDURE — G0438 PPPS, INITIAL VISIT: HCPCS | Performed by: FAMILY MEDICINE

## 2019-02-08 PROCEDURE — G0008 ADMIN INFLUENZA VIRUS VAC: HCPCS | Performed by: FAMILY MEDICINE

## 2019-02-08 PROCEDURE — 3725F SCREEN DEPRESSION PERFORMED: CPT | Performed by: FAMILY MEDICINE

## 2019-02-08 PROCEDURE — 90686 IIV4 VACC NO PRSV 0.5 ML IM: CPT | Performed by: FAMILY MEDICINE

## 2019-02-08 NOTE — PROGRESS NOTES
Assessment and Plan:    26 y o  male with Down Syndrome who lives with caregiver Ms Mayte Rizvi  Patient has mild cognitive impairment, but makes own medical decisions per caregiver  He agrees to flushot today after explaining pros/cons to him  Per patient and caregiver, he sleeps well, has good appetite, mood is good and stable  1  Health Maintenance  -Colonoscopy: N/A, no family history of personal history of CRC, asymptomatic  -Labs: All labs UTD  -Immunizations: He is due for flu shot today  Otherwise up to date for immunizations  2  Discussed the patient's BMI with care, (Body mass index is 36 39 kg/m²  )  Advised a healthy, balanced diet and regular exercise for 30 minutes 4-5 times a week  3  Patient Counseling:   -Nutrition: Stressed importance of moderation in sodium/caffeine intake, saturated fat and cholesterol, caloric balance, sufficient intake of fresh fruits, vegetables, fiber, calcium, and iron  -Exercise: Stressed the importance of regular exercise  He participates in basketball regularly    -Substance Abuse: Denies smoking, drinks rarely, no recreational IVDA  Discussed primary prevention of tobacco, alcohol, or other drug use; driving or other dangerous activities under the influence; availability of treatment for abuse  -Dental health: Follows with dentist annually  Discussed importance of regular tooth brushing, flossing, and dental visits  4  Other diagnoses addressed today:   Other hyperlipidemia  FLP, TSH up to date  Lipitor 40 mg PO daily discontinued today  Will repeat FLP in one year  RTC PRN    Encounter for immunization  Flushot received today    Two separate Physical Forms completed and given to caregiver  Caregiver advised to follow up q 6 months instead of annually  RTC in 6 months  Return for annual physical in one year  Subjective:  Donn Mooney is a 26 y o  male and is here for a comprehensive physical exam    Acute Complaints: None      I have reviewed the patient's PMH, Social History, Medication List and Allergies  Review of Systems:  Review of Systems   Constitutional: Negative for chills and fever  HENT: Negative for ear pain  Eyes: Negative for visual disturbance  Respiratory: Negative for cough and shortness of breath  Cardiovascular: Negative for chest pain and palpitations  Gastrointestinal: Negative for abdominal pain, diarrhea, nausea and vomiting  Musculoskeletal: Negative for arthralgias  Skin: Negative for rash  Neurological: Negative for headaches  Hematological: Does not bruise/bleed easily  PHQ-9 Depression Screening    PHQ-9:    Frequency of the following problems over the past two weeks:       Little interest or pleasure in doing things:  0 - not at all  Feeling down, depressed, or hopeless:  0 - not at all  PHQ-2 Score:  0         Objective:  /78   Pulse 76   Temp 97 7 °F (36 5 °C)   Resp 16   Ht 5' 1 6" (1 565 m)   Wt 89 1 kg (196 lb 6 4 oz)   BMI 36 39 kg/m²   Physical Exam   Constitutional: He is oriented to person, place, and time  No distress  HENT:   Head: Normocephalic and atraumatic  Right Ear: External ear normal    Left Ear: External ear normal    Nose: Nose normal    Mouth/Throat: Oropharynx is clear and moist  No oropharyngeal exudate  Eyes: Pupils are equal, round, and reactive to light  Conjunctivae and EOM are normal  Right eye exhibits no discharge  Left eye exhibits no discharge  No scleral icterus  Neck: Normal range of motion  Neck supple  No thyromegaly present  Cardiovascular: Normal rate, regular rhythm and normal heart sounds  No murmur heard  Pulmonary/Chest: Effort normal and breath sounds normal  No respiratory distress  He has no wheezes  He has no rales  Abdominal: Soft  Bowel sounds are normal  There is no tenderness  There is no guarding  Musculoskeletal: Normal range of motion  He exhibits no edema or tenderness     Lymphadenopathy:     He has no cervical adenopathy  Neurological: He is alert and oriented to person, place, and time  No cranial nerve deficit  He exhibits normal muscle tone  Coordination normal    Skin: Skin is warm and dry  No rash noted  He is not diaphoretic  No pallor  Psychiatric: He has a normal mood and affect   Thought content normal          Patient Care Team:  Genaro Gotti MD as PCP - General (Family Medicine)  MD Darrel Lobato MD Noretta Lies, MD

## 2019-02-08 NOTE — ASSESSMENT & PLAN NOTE
FLP, TSH up to date  Lipitor 40 mg PO daily discontinued today  Will repeat FLP in one year    RTC PRN

## 2019-04-15 ENCOUNTER — TELEPHONE (OUTPATIENT)
Dept: NEUROLOGY | Facility: CLINIC | Age: 34
End: 2019-04-15

## 2019-05-19 ENCOUNTER — HOSPITAL ENCOUNTER (EMERGENCY)
Facility: HOSPITAL | Age: 34
Discharge: HOME | End: 2019-05-19
Attending: EMERGENCY MEDICINE | Admitting: EMERGENCY MEDICINE
Payer: COMMERCIAL

## 2019-05-19 VITALS
SYSTOLIC BLOOD PRESSURE: 129 MMHG | WEIGHT: 195 LBS | RESPIRATION RATE: 16 BRPM | HEART RATE: 52 BPM | BODY MASS INDEX: 36.13 KG/M2 | OXYGEN SATURATION: 97 % | DIASTOLIC BLOOD PRESSURE: 64 MMHG | TEMPERATURE: 98.4 F

## 2019-05-19 DIAGNOSIS — Z71.1 WORRIED WELL: Primary | ICD-10-CM

## 2019-05-19 PROCEDURE — 99281 EMR DPT VST MAYX REQ PHY/QHP: CPT | Performed by: PHYSICIAN ASSISTANT

## 2019-05-19 PROCEDURE — 99283 EMERGENCY DEPT VISIT LOW MDM: CPT

## 2019-06-25 ENCOUNTER — OFFICE VISIT (OUTPATIENT)
Dept: NEUROLOGY | Facility: CLINIC | Age: 34
End: 2019-06-25
Payer: COMMERCIAL

## 2019-06-25 VITALS
HEIGHT: 61 IN | DIASTOLIC BLOOD PRESSURE: 64 MMHG | SYSTOLIC BLOOD PRESSURE: 124 MMHG | WEIGHT: 198 LBS | BODY MASS INDEX: 37.38 KG/M2

## 2019-06-25 DIAGNOSIS — Q90.9 COMPLETE TRISOMY 21 SYNDROME: Primary | ICD-10-CM

## 2019-06-25 PROCEDURE — 99214 OFFICE O/P EST MOD 30 MIN: CPT | Performed by: PSYCHIATRY & NEUROLOGY

## 2019-10-01 ENCOUNTER — OFFICE VISIT (OUTPATIENT)
Dept: FAMILY MEDICINE CLINIC | Facility: CLINIC | Age: 34
End: 2019-10-01

## 2019-10-01 VITALS
TEMPERATURE: 97.2 F | DIASTOLIC BLOOD PRESSURE: 80 MMHG | WEIGHT: 198.4 LBS | HEIGHT: 61 IN | SYSTOLIC BLOOD PRESSURE: 120 MMHG | BODY MASS INDEX: 37.46 KG/M2 | RESPIRATION RATE: 16 BRPM | HEART RATE: 68 BPM

## 2019-10-01 DIAGNOSIS — B35.3 TINEA PEDIS OF LEFT FOOT: Primary | ICD-10-CM

## 2019-10-01 DIAGNOSIS — K43.9 VENTRAL HERNIA WITHOUT OBSTRUCTION OR GANGRENE: ICD-10-CM

## 2019-10-01 PROCEDURE — 3008F BODY MASS INDEX DOCD: CPT | Performed by: FAMILY MEDICINE

## 2019-10-01 PROCEDURE — 99213 OFFICE O/P EST LOW 20 MIN: CPT | Performed by: FAMILY MEDICINE

## 2019-10-01 RX ORDER — PRENATAL VIT 91/IRON/FOLIC/DHA 28-975-200
COMBINATION PACKAGE (EA) ORAL 2 TIMES DAILY
Qty: 24 G | Refills: 1 | Status: SHIPPED | OUTPATIENT
Start: 2019-10-01 | End: 2021-07-30 | Stop reason: ALTCHOICE

## 2019-10-01 NOTE — PROGRESS NOTES
Assessment/Plan:       Problem List Items Addressed This Visit        Musculoskeletal and Integument    Tinea pedis of left foot - Primary     - Topical Lamisil 1% cream BID for 14 days   - Foot hygiene and care discussed          Relevant Medications    terbinafine (LamISIL) 1 % cream       Other    Ventral hernia without obstruction or gangrene     - Ventral hernia versus diastasis recti, non-painful, easily reproducible and no red flag signs/symptoms  - Referral to general surgery for further evaluation   - Return precautions discussed          Relevant Orders    Ambulatory referral to General Surgery            Subjective:      Patient ID: Gwrodríguez Trimble is a 26 y o  male with past medical history significant for trisomy 21 syndrome and hyperlipidemia presenting for evaluation of abdominal lump and foot rash  HPI  The patient presents today with concerns of a lump above his navel  Per caregiver, the lump has been present for approx 2 years/as long as she has known him but she thought it was just his navel  The lump was brought to her attention 1 week ago by his   The patient has not noticed the lump until it was brought to his attention as well  He denies any pains in the area or recent changes in the size of the lump  The caregiver has noticed that hair doesn't grow in the area of the lump  He denies any abdominal pain, constipation or diarrhea, admits to good appetite  The patient also presents with concerns of possible athlete's foot  Per caregiver, the patient has had athlete's foot in both feet for the past few years and she believes it has worsened, especially on the left foot  The patient denies itching or pains in his feet  OTC Athletes foot cream has been applied to both feet  Per caregiver, the right foot has improved but the left foot is worsening      The following portions of the patient's history were reviewed and updated as appropriate: allergies, current medications, past family history, past medical history, past social history, past surgical history and problem list     Review of Systems   Constitutional: Negative for appetite change and fever  HENT: Negative for congestion and sore throat  Respiratory: Negative for cough and shortness of breath  Cardiovascular: Negative for chest pain  Gastrointestinal: Negative for abdominal distention, abdominal pain, blood in stool, constipation, diarrhea, nausea and vomiting  Genitourinary: Negative for difficulty urinating and dysuria  Skin: Positive for rash (on both feet, worse on left)  Objective:      /80   Pulse 68   Temp (!) 97 2 °F (36 2 °C)   Resp 16   Ht 5' 1" (1 549 m)   Wt 90 kg (198 lb 6 4 oz)   BMI 37 49 kg/m²          Physical Exam   Constitutional: He appears well-developed and well-nourished  No distress  HENT:   Head: Normocephalic and atraumatic  Trisomy 21 typical facies    Neck: Neck supple  Cardiovascular: Normal rate, regular rhythm, normal heart sounds and intact distal pulses  No murmur heard  Pulmonary/Chest: Effort normal and breath sounds normal  No stridor  No respiratory distress  He has no wheezes  He has no rales  Abdominal: Soft  Bowel sounds are normal  He exhibits no distension  There is no tenderness  There is no rebound and no guarding  A hernia (soft, reducbile; located above the navel, prominent with sitting up and bearing down) is present  Neurological: He is alert  Skin: Skin is warm  Rash noted  He is not diaphoretic  Peeling with slight erythema around the plantar surface of left foot, medial and lateral sides of left foot and between the interdigital web spaces   Vitals reviewed        Steve Mckeon DO PGY-3   [address]

## 2019-10-02 PROBLEM — B35.3 TINEA PEDIS OF LEFT FOOT: Status: ACTIVE | Noted: 2019-10-02

## 2019-10-02 PROBLEM — K43.9 VENTRAL HERNIA WITHOUT OBSTRUCTION OR GANGRENE: Status: ACTIVE | Noted: 2019-10-02

## 2019-10-02 NOTE — ASSESSMENT & PLAN NOTE
- Ventral hernia versus diastasis recti, non-painful, easily reproducible and no red flag signs/symptoms  - Referral to general surgery for further evaluation   - Return precautions discussed

## 2019-10-15 PROBLEM — K80.10 CALCULUS OF GALLBLADDER WITH CHRONIC CHOLECYSTITIS WITHOUT OBSTRUCTION: Status: ACTIVE | Noted: 2019-10-15

## 2019-10-15 NOTE — H&P (VIEW-ONLY)
Assessment/Plan:  Patient presents with a ventral hernia  He has pain associated with the bulge  Desires operative repair  Risks and benefits explained patient is agreeable  Diagnoses and all orders for this visit:    Ventral hernia without obstruction or gangrene  -     Ambulatory referral to General Surgery        Subjective:      Patient ID: Becca Alfonso is a 37 y o  male  Patient presents for ventral hernia consult  States has had a bulge for 2+ years  Denies pain  The following portions of the patient's history were reviewed and updated as appropriate:     He  has a past medical history of Asthma, Down's syndrome, and Spinal pain  He  has no past surgical history on file  His family history is not on file  He  reports that he has never smoked  He has never used smokeless tobacco  He reports that he drinks alcohol  He reports that he does not use drugs  Current Outpatient Medications   Medication Sig Dispense Refill    Cetirizine HCl (ZYRTEC ALLERGY) 10 MG CAPS Take 1 tablet by mouth      fluticasone (FLONASE) 50 mcg/act nasal spray 1 spray into each nostril daily      ibuprofen (MOTRIN) 600 mg tablet Take 1 tablet (600 mg total) by mouth every 6 (six) hours as needed for mild pain  30 tablet 0    terbinafine (LamISIL) 1 % cream Apply topically 2 (two) times a day for 14 days 24 g 1     No current facility-administered medications for this visit  He has No Known Allergies       Review of Systems   Constitutional: Negative  Negative for activity change  HENT: Negative  Eyes: Negative  Respiratory: Negative  Cardiovascular: Negative  Gastrointestinal: Negative  Endocrine: Negative  Genitourinary: Negative  Musculoskeletal: Negative  Skin: Negative  Allergic/Immunologic: Negative  Neurological: Negative  Psychiatric/Behavioral: Negative for agitation, behavioral problems and confusion  The patient is not nervous/anxious            Objective:      BP 116/72 (BP Location: Left arm, Patient Position: Sitting)   Pulse (!) 44   Resp 12   Ht 5' 2" (1 575 m)   Wt 93 4 kg (206 lb)   BMI 37 68 kg/m²          Physical Exam   Constitutional: He is oriented to person, place, and time  He appears well-developed and well-nourished  HENT:   Head: Normocephalic and atraumatic  Right Ear: External ear normal    Left Ear: External ear normal    Mouth/Throat: Oropharynx is clear and moist    Eyes: Pupils are equal, round, and reactive to light  Conjunctivae and EOM are normal  Right eye exhibits no discharge  Left eye exhibits no discharge  No scleral icterus  Neck: Normal range of motion  Neck supple  No tracheal deviation present  No thyromegaly present  Cardiovascular: Normal rate, regular rhythm and normal heart sounds  Exam reveals no friction rub  No murmur heard  Pulmonary/Chest: Effort normal and breath sounds normal  No stridor  No respiratory distress  He has no wheezes  He exhibits no tenderness  Abdominal: Soft  Bowel sounds are normal  He exhibits no distension and no mass  There is no tenderness  There is no rebound and no guarding  A hernia (Ventral hernia present  This is reducible ) is present  Musculoskeletal: Normal range of motion  He exhibits no tenderness  Lymphadenopathy:     He has no cervical adenopathy  Neurological: He is alert and oriented to person, place, and time  No cranial nerve deficit  Coordination normal    Skin: Skin is warm and dry  No rash noted  He is not diaphoretic  No erythema  Psychiatric: He has a normal mood and affect   His behavior is normal  Judgment normal

## 2019-10-29 ENCOUNTER — OFFICE VISIT (OUTPATIENT)
Dept: FAMILY MEDICINE CLINIC | Facility: CLINIC | Age: 34
End: 2019-10-29

## 2019-10-29 VITALS
HEART RATE: 60 BPM | RESPIRATION RATE: 16 BRPM | WEIGHT: 196.8 LBS | TEMPERATURE: 96.6 F | SYSTOLIC BLOOD PRESSURE: 122 MMHG | BODY MASS INDEX: 36 KG/M2 | DIASTOLIC BLOOD PRESSURE: 70 MMHG

## 2019-10-29 DIAGNOSIS — L85.3 DRY SKIN: Primary | ICD-10-CM

## 2019-10-29 DIAGNOSIS — Z23 ENCOUNTER FOR IMMUNIZATION: ICD-10-CM

## 2019-10-29 PROCEDURE — 90686 IIV4 VACC NO PRSV 0.5 ML IM: CPT | Performed by: FAMILY MEDICINE

## 2019-10-29 PROCEDURE — 99213 OFFICE O/P EST LOW 20 MIN: CPT | Performed by: FAMILY MEDICINE

## 2019-10-29 PROCEDURE — G0008 ADMIN INFLUENZA VIRUS VAC: HCPCS | Performed by: FAMILY MEDICINE

## 2019-10-29 NOTE — ASSESSMENT & PLAN NOTE
Recently treated Tinea Pedis on left foot/toes has resolved  Patient continues to have some non-pruritic non-tender desquamating dry skin on medial and plantar aspect of left foot, with no other significant findings  He reports washing his feet with soap at least couple of times daily without use of any moisturizers  Patient and caregiver counseled  Rx for petroleum jelly PRN given  Return precautions reviewed

## 2019-10-29 NOTE — PROGRESS NOTES
Assessment/Plan: Umair Ray is a 37 y o  male with:   Problem List Items Addressed This Visit        Other    Encounter for immunization    Relevant Orders    influenza vaccine, [id number], quadrivalent, 0 5 mL, preservative-free, for adult and pediatric patients 6 mos+ (AFLURIA, FLUARIX, FLULAVAL, FLUZONE) (Completed)    Dry skin - Primary     Recently treated Tinea Pedis on left foot/toes has resolved  Patient continues to have some non-pruritic non-tender desquamating dry skin on medial and plantar aspect of left foot, with no other significant findings  He reports washing his feet with soap at least couple of times daily without use of any moisturizers  Patient and caregiver counseled  Rx for petroleum jelly PRN given  Return precautions reviewed  Relevant Medications    white petrolatum ointment        Chief Complaint:  Chief Complaint   Patient presents with    Rash     follow up     - Flushot given in office today  - Form completed  Given to caregiver  HPI:   Umair Ray is a 37 y o  male is here for follow up of rash on his left foot  He was recently treated for fungal infection of left foot and toes that has resolved  He's here with caregiver Mr Brenda Abarca who reports compliance with therapy  He now has a area of dry skin on medial and plantar aspect of his left foot that is not itchy or painful  He denies any joint pain, numbness, tingling, or systemic involvement  He reports washing his left foot at least a couple of times daily with soap  Denies use of moisturizers  No other acute complaints  History:  Current Outpatient Medications   Medication Sig Dispense Refill    Cetirizine HCl (ZYRTEC ALLERGY) 10 MG CAPS Take 1 tablet by mouth      fluticasone (FLONASE) 50 mcg/act nasal spray 1 spray into each nostril daily      ibuprofen (MOTRIN) 600 mg tablet Take 1 tablet (600 mg total) by mouth every 6 (six) hours as needed for mild pain   30 tablet 0    terbinafine (LamISIL) 1 % cream Apply topically 2 (two) times a day for 14 days 24 g 1    white petrolatum ointment Apply topically 2 (two) times a day as needed for dry skin 106 g 2     No current facility-administered medications for this visit  PMH reviewed  ROS:  As Per HPI    Physical Exam:  /70 (BP Location: Left arm, Patient Position: Sitting, Cuff Size: Large)   Pulse 60   Temp (!) 96 6 °F (35 9 °C) (Tympanic)   Resp 16   Wt 89 3 kg (196 lb 12 8 oz)   BMI 36 00 kg/m²   Physical Exam   Constitutional: No distress  HENT:   Head: Normocephalic and atraumatic  Cardiovascular: Normal rate, regular rhythm and normal heart sounds  Exam reveals no friction rub  No murmur heard  Pulmonary/Chest: Effort normal and breath sounds normal  No respiratory distress  He has no wheezes  Abdominal: Normal appearance  Musculoskeletal: He exhibits no edema or tenderness  Neurological: He is alert  He exhibits normal muscle tone  Coordination normal    Skin: Skin is warm and dry  Capillary refill takes less than 2 seconds  He is not diaphoretic  Dry desquamating non-tender patches 4x4 cm on medial and plantar aspect of R foot noted  No suspicious findings for infection  No edema or neurovascular compromise  Vitals reviewed          Marie Davila MD

## 2019-10-31 ENCOUNTER — APPOINTMENT (OUTPATIENT)
Dept: LAB | Facility: CLINIC | Age: 34
End: 2019-10-31
Payer: COMMERCIAL

## 2019-10-31 ENCOUNTER — OFFICE VISIT (OUTPATIENT)
Dept: LAB | Facility: CLINIC | Age: 34
End: 2019-10-31
Payer: COMMERCIAL

## 2019-10-31 DIAGNOSIS — K43.9 VENTRAL HERNIA WITHOUT OBSTRUCTION OR GANGRENE: ICD-10-CM

## 2019-10-31 LAB
ALBUMIN SERPL BCP-MCNC: 3 G/DL (ref 3.5–5)
ALP SERPL-CCNC: 66 U/L (ref 46–116)
ALT SERPL W P-5'-P-CCNC: 24 U/L (ref 12–78)
ANION GAP SERPL CALCULATED.3IONS-SCNC: 8 MMOL/L (ref 4–13)
AST SERPL W P-5'-P-CCNC: 16 U/L (ref 5–45)
BASOPHILS # BLD AUTO: 0.08 THOUSANDS/ΜL (ref 0–0.1)
BASOPHILS NFR BLD AUTO: 1 % (ref 0–1)
BILIRUB SERPL-MCNC: 0.4 MG/DL (ref 0.2–1)
BUN SERPL-MCNC: 16 MG/DL (ref 5–25)
CALCIUM SERPL-MCNC: 8.7 MG/DL (ref 8.3–10.1)
CHLORIDE SERPL-SCNC: 101 MMOL/L (ref 100–108)
CO2 SERPL-SCNC: 31 MMOL/L (ref 21–32)
CREAT SERPL-MCNC: 1.3 MG/DL (ref 0.6–1.3)
EOSINOPHIL # BLD AUTO: 0.09 THOUSAND/ΜL (ref 0–0.61)
EOSINOPHIL NFR BLD AUTO: 1 % (ref 0–6)
ERYTHROCYTE [DISTWIDTH] IN BLOOD BY AUTOMATED COUNT: 14.1 % (ref 11.6–15.1)
GFR SERPL CREATININE-BSD FRML MDRD: 70 ML/MIN/1.73SQ M
GLUCOSE SERPL-MCNC: 88 MG/DL (ref 65–140)
HCT VFR BLD AUTO: 55.9 % (ref 36.5–49.3)
HGB BLD-MCNC: 18.4 G/DL (ref 12–17)
IMM GRANULOCYTES # BLD AUTO: 0.15 THOUSAND/UL (ref 0–0.2)
IMM GRANULOCYTES NFR BLD AUTO: 2 % (ref 0–2)
LYMPHOCYTES # BLD AUTO: 1.41 THOUSANDS/ΜL (ref 0.6–4.47)
LYMPHOCYTES NFR BLD AUTO: 22 % (ref 14–44)
MCH RBC QN AUTO: 32.3 PG (ref 26.8–34.3)
MCHC RBC AUTO-ENTMCNC: 32.9 G/DL (ref 31.4–37.4)
MCV RBC AUTO: 98 FL (ref 82–98)
MONOCYTES # BLD AUTO: 0.69 THOUSAND/ΜL (ref 0.17–1.22)
MONOCYTES NFR BLD AUTO: 11 % (ref 4–12)
NEUTROPHILS # BLD AUTO: 3.88 THOUSANDS/ΜL (ref 1.85–7.62)
NEUTS SEG NFR BLD AUTO: 63 % (ref 43–75)
NRBC BLD AUTO-RTO: 0 /100 WBCS
PLATELET # BLD AUTO: 295 THOUSANDS/UL (ref 149–390)
PMV BLD AUTO: 10.2 FL (ref 8.9–12.7)
POTASSIUM SERPL-SCNC: 4 MMOL/L (ref 3.5–5.3)
PROT SERPL-MCNC: 7.4 G/DL (ref 6.4–8.2)
RBC # BLD AUTO: 5.69 MILLION/UL (ref 3.88–5.62)
SODIUM SERPL-SCNC: 140 MMOL/L (ref 136–145)
WBC # BLD AUTO: 6.3 THOUSAND/UL (ref 4.31–10.16)

## 2019-10-31 PROCEDURE — 80053 COMPREHEN METABOLIC PANEL: CPT

## 2019-10-31 PROCEDURE — 93005 ELECTROCARDIOGRAM TRACING: CPT

## 2019-10-31 PROCEDURE — 36415 COLL VENOUS BLD VENIPUNCTURE: CPT

## 2019-10-31 PROCEDURE — 85025 COMPLETE CBC W/AUTO DIFF WBC: CPT

## 2019-10-31 NOTE — PRE-PROCEDURE INSTRUCTIONS
Pre-Surgery Instructions:   Medication Instructions    Cetirizine HCl (ZYRTEC ALLERGY) 10 MG CAPS Instructed patient per Anesthesia Guidelines   fluticasone (FLONASE) 50 mcg/act nasal spray Instructed patient per Anesthesia Guidelines   ibuprofen (MOTRIN) 600 mg tablet Patient was instructed by Physician and understands   terbinafine (LamISIL) 1 % cream Instructed patient per Anesthesia Guidelines  Pre op and bathing instructions reviewed   Pt has hibiclens

## 2019-11-01 DIAGNOSIS — Z91.89 RISK FACTORS FOR OBSTRUCTIVE SLEEP APNEA: Primary | ICD-10-CM

## 2019-11-01 LAB
ATRIAL RATE: 49 BPM
P AXIS: 25 DEGREES
PR INTERVAL: 136 MS
QRS AXIS: 58 DEGREES
QRSD INTERVAL: 88 MS
QT INTERVAL: 416 MS
QTC INTERVAL: 375 MS
T WAVE AXIS: -15 DEGREES
VENTRICULAR RATE: 49 BPM

## 2019-11-01 PROCEDURE — 93010 ELECTROCARDIOGRAM REPORT: CPT | Performed by: INTERNAL MEDICINE

## 2019-11-05 ENCOUNTER — ANESTHESIA EVENT (OUTPATIENT)
Dept: PERIOP | Facility: HOSPITAL | Age: 34
End: 2019-11-05
Payer: COMMERCIAL

## 2019-11-06 ENCOUNTER — ANESTHESIA (OUTPATIENT)
Dept: PERIOP | Facility: HOSPITAL | Age: 34
End: 2019-11-06
Payer: COMMERCIAL

## 2019-11-06 ENCOUNTER — HOSPITAL ENCOUNTER (OUTPATIENT)
Facility: HOSPITAL | Age: 34
Setting detail: OUTPATIENT SURGERY
Discharge: HOME | End: 2019-11-06
Attending: SURGERY | Admitting: SURGERY
Payer: COMMERCIAL

## 2019-11-06 VITALS
WEIGHT: 196 LBS | TEMPERATURE: 97 F | HEIGHT: 62 IN | BODY MASS INDEX: 36.07 KG/M2 | OXYGEN SATURATION: 98 % | DIASTOLIC BLOOD PRESSURE: 60 MMHG | SYSTOLIC BLOOD PRESSURE: 118 MMHG | HEART RATE: 46 BPM | RESPIRATION RATE: 16 BRPM

## 2019-11-06 PROCEDURE — C1781 MESH (IMPLANTABLE): HCPCS | Performed by: SURGERY

## 2019-11-06 PROCEDURE — 49560 PR REPAIR INCISIONAL HERNIA,REDUCIBLE: CPT | Performed by: SURGERY

## 2019-11-06 PROCEDURE — 49568 PR IMPLANT MESH HERNIA REPAIR/DEBRIDEMENT CLOSURE: CPT | Performed by: SURGERY

## 2019-11-06 DEVICE — VENTRALEX ST HERNIA PATCH
Type: IMPLANTABLE DEVICE | Site: ABDOMEN | Status: FUNCTIONAL
Brand: VENTRALEX ST HERNIA PATCH

## 2019-11-06 RX ORDER — ONDANSETRON 2 MG/ML
4 INJECTION INTRAMUSCULAR; INTRAVENOUS EVERY 4 HOURS PRN
Status: DISCONTINUED | OUTPATIENT
Start: 2019-11-06 | End: 2019-11-06 | Stop reason: HOSPADM

## 2019-11-06 RX ORDER — LIDOCAINE HYDROCHLORIDE 10 MG/ML
0.5 INJECTION, SOLUTION EPIDURAL; INFILTRATION; INTRACAUDAL; PERINEURAL ONCE AS NEEDED
Status: DISCONTINUED | OUTPATIENT
Start: 2019-11-06 | End: 2019-11-06 | Stop reason: HOSPADM

## 2019-11-06 RX ORDER — FENTANYL CITRATE/PF 50 MCG/ML
25 SYRINGE (ML) INJECTION
Status: DISCONTINUED | OUTPATIENT
Start: 2019-11-06 | End: 2019-11-06 | Stop reason: HOSPADM

## 2019-11-06 RX ORDER — HYDROMORPHONE HCL/PF 1 MG/ML
0.2 SYRINGE (ML) INJECTION
Status: DISCONTINUED | OUTPATIENT
Start: 2019-11-06 | End: 2019-11-06 | Stop reason: HOSPADM

## 2019-11-06 RX ORDER — CEFAZOLIN SODIUM 2 G/50ML
2000 SOLUTION INTRAVENOUS ONCE
Status: COMPLETED | OUTPATIENT
Start: 2019-11-06 | End: 2019-11-06

## 2019-11-06 RX ORDER — FENTANYL CITRATE 50 UG/ML
INJECTION, SOLUTION INTRAMUSCULAR; INTRAVENOUS AS NEEDED
Status: DISCONTINUED | OUTPATIENT
Start: 2019-11-06 | End: 2019-11-06 | Stop reason: SURG

## 2019-11-06 RX ORDER — MAGNESIUM HYDROXIDE 1200 MG/15ML
LIQUID ORAL AS NEEDED
Status: DISCONTINUED | OUTPATIENT
Start: 2019-11-06 | End: 2019-11-06 | Stop reason: HOSPADM

## 2019-11-06 RX ORDER — ONDANSETRON 2 MG/ML
INJECTION INTRAMUSCULAR; INTRAVENOUS AS NEEDED
Status: DISCONTINUED | OUTPATIENT
Start: 2019-11-06 | End: 2019-11-06 | Stop reason: SURG

## 2019-11-06 RX ORDER — LABETALOL 20 MG/4 ML (5 MG/ML) INTRAVENOUS SYRINGE
10
Status: DISCONTINUED | OUTPATIENT
Start: 2019-11-06 | End: 2019-11-06 | Stop reason: HOSPADM

## 2019-11-06 RX ORDER — PROPOFOL 10 MG/ML
INJECTION, EMULSION INTRAVENOUS AS NEEDED
Status: DISCONTINUED | OUTPATIENT
Start: 2019-11-06 | End: 2019-11-06 | Stop reason: SURG

## 2019-11-06 RX ORDER — SODIUM CHLORIDE, SODIUM LACTATE, POTASSIUM CHLORIDE, CALCIUM CHLORIDE 600; 310; 30; 20 MG/100ML; MG/100ML; MG/100ML; MG/100ML
INJECTION, SOLUTION INTRAVENOUS CONTINUOUS PRN
Status: DISCONTINUED | OUTPATIENT
Start: 2019-11-06 | End: 2019-11-06 | Stop reason: SURG

## 2019-11-06 RX ORDER — DEXAMETHASONE SODIUM PHOSPHATE 4 MG/ML
INJECTION, SOLUTION INTRA-ARTICULAR; INTRALESIONAL; INTRAMUSCULAR; INTRAVENOUS; SOFT TISSUE AS NEEDED
Status: DISCONTINUED | OUTPATIENT
Start: 2019-11-06 | End: 2019-11-06 | Stop reason: SURG

## 2019-11-06 RX ORDER — HYDROMORPHONE HCL/PF 1 MG/ML
0.5 SYRINGE (ML) INJECTION
Status: DISCONTINUED | OUTPATIENT
Start: 2019-11-06 | End: 2019-11-06 | Stop reason: HOSPADM

## 2019-11-06 RX ORDER — METOCLOPRAMIDE HYDROCHLORIDE 5 MG/ML
10 INJECTION INTRAMUSCULAR; INTRAVENOUS ONCE AS NEEDED
Status: DISCONTINUED | OUTPATIENT
Start: 2019-11-06 | End: 2019-11-06 | Stop reason: HOSPADM

## 2019-11-06 RX ORDER — MIDAZOLAM HYDROCHLORIDE 2 MG/2ML
INJECTION, SOLUTION INTRAMUSCULAR; INTRAVENOUS AS NEEDED
Status: DISCONTINUED | OUTPATIENT
Start: 2019-11-06 | End: 2019-11-06 | Stop reason: SURG

## 2019-11-06 RX ORDER — LIDOCAINE HYDROCHLORIDE 10 MG/ML
INJECTION, SOLUTION INFILTRATION; PERINEURAL AS NEEDED
Status: DISCONTINUED | OUTPATIENT
Start: 2019-11-06 | End: 2019-11-06 | Stop reason: SURG

## 2019-11-06 RX ORDER — OXYCODONE HYDROCHLORIDE AND ACETAMINOPHEN 5; 325 MG/1; MG/1
1 TABLET ORAL EVERY 4 HOURS PRN
Status: DISCONTINUED | OUTPATIENT
Start: 2019-11-06 | End: 2019-11-06 | Stop reason: HOSPADM

## 2019-11-06 RX ORDER — KETOROLAC TROMETHAMINE 30 MG/ML
INJECTION, SOLUTION INTRAMUSCULAR; INTRAVENOUS AS NEEDED
Status: DISCONTINUED | OUTPATIENT
Start: 2019-11-06 | End: 2019-11-06 | Stop reason: SURG

## 2019-11-06 RX ORDER — BUPIVACAINE HYDROCHLORIDE 2.5 MG/ML
INJECTION, SOLUTION EPIDURAL; INFILTRATION; INTRACAUDAL AS NEEDED
Status: DISCONTINUED | OUTPATIENT
Start: 2019-11-06 | End: 2019-11-06 | Stop reason: HOSPADM

## 2019-11-06 RX ORDER — ONDANSETRON 2 MG/ML
4 INJECTION INTRAMUSCULAR; INTRAVENOUS ONCE AS NEEDED
Status: DISCONTINUED | OUTPATIENT
Start: 2019-11-06 | End: 2019-11-06 | Stop reason: HOSPADM

## 2019-11-06 RX ORDER — PROMETHAZINE HYDROCHLORIDE 25 MG/ML
12.5 INJECTION, SOLUTION INTRAMUSCULAR; INTRAVENOUS ONCE AS NEEDED
Status: DISCONTINUED | OUTPATIENT
Start: 2019-11-06 | End: 2019-11-06 | Stop reason: HOSPADM

## 2019-11-06 RX ORDER — LORAZEPAM 2 MG/ML
1 INJECTION INTRAMUSCULAR
Status: DISCONTINUED | OUTPATIENT
Start: 2019-11-06 | End: 2019-11-06 | Stop reason: HOSPADM

## 2019-11-06 RX ORDER — ALBUTEROL SULFATE 2.5 MG/3ML
2.5 SOLUTION RESPIRATORY (INHALATION) ONCE AS NEEDED
Status: DISCONTINUED | OUTPATIENT
Start: 2019-11-06 | End: 2019-11-06 | Stop reason: HOSPADM

## 2019-11-06 RX ORDER — HYDRALAZINE HYDROCHLORIDE 20 MG/ML
5 INJECTION INTRAMUSCULAR; INTRAVENOUS
Status: DISCONTINUED | OUTPATIENT
Start: 2019-11-06 | End: 2019-11-06 | Stop reason: HOSPADM

## 2019-11-06 RX ADMIN — KETOROLAC TROMETHAMINE 30 MG: 30 INJECTION, SOLUTION INTRAMUSCULAR at 08:12

## 2019-11-06 RX ADMIN — PHENYLEPHRINE HYDROCHLORIDE 200 MCG: 10 INJECTION INTRAVENOUS at 07:51

## 2019-11-06 RX ADMIN — LIDOCAINE HYDROCHLORIDE 100 MG: 10 INJECTION, SOLUTION INFILTRATION; PERINEURAL at 07:45

## 2019-11-06 RX ADMIN — CEFAZOLIN SODIUM 2000 MG: 2 SOLUTION INTRAVENOUS at 07:35

## 2019-11-06 RX ADMIN — FENTANYL CITRATE 25 MCG: 50 INJECTION INTRAMUSCULAR; INTRAVENOUS at 07:45

## 2019-11-06 RX ADMIN — DEXAMETHASONE SODIUM PHOSPHATE 4 MG: 4 INJECTION, SOLUTION INTRAMUSCULAR; INTRAVENOUS at 07:50

## 2019-11-06 RX ADMIN — SODIUM CHLORIDE, SODIUM LACTATE, POTASSIUM CHLORIDE, AND CALCIUM CHLORIDE: .6; .31; .03; .02 INJECTION, SOLUTION INTRAVENOUS at 07:40

## 2019-11-06 RX ADMIN — MIDAZOLAM HYDROCHLORIDE 2 MG: 1 INJECTION, SOLUTION INTRAMUSCULAR; INTRAVENOUS at 07:41

## 2019-11-06 RX ADMIN — ONDANSETRON 4 MG: 2 INJECTION INTRAMUSCULAR; INTRAVENOUS at 08:12

## 2019-11-06 RX ADMIN — PHENYLEPHRINE HYDROCHLORIDE 200 MCG: 10 INJECTION INTRAVENOUS at 07:49

## 2019-11-06 RX ADMIN — FENTANYL CITRATE 25 MCG: 50 INJECTION INTRAMUSCULAR; INTRAVENOUS at 08:05

## 2019-11-06 RX ADMIN — PROPOFOL 200 MG: 10 INJECTION, EMULSION INTRAVENOUS at 07:45

## 2019-11-06 NOTE — ANESTHESIA POSTPROCEDURE EVALUATION
Post-Op Assessment Note    CV Status:  Stable    Pain management: adequate     Mental Status:  Arousable   Hydration Status:  Stable   PONV Controlled:  None   Airway Patency:  Patent   Post Op Vitals Reviewed: Yes      Staff: Anesthesiologist           BP (P) 100/54 (11/06/19 0827)    Temp (!) (P) 97 2 °F (36 2 °C) (11/06/19 0827)    Pulse (P) 58 (11/06/19 0827)   Resp (P) 18 (11/06/19 0827)    SpO2 (P) 97 % (11/06/19 0827)

## 2019-11-06 NOTE — OP NOTE
OPERATIVE REPORT  PATIENT NAME: Preeti Schilling    :  1985  MRN: [id number]  Pt Location: AN OR ROOM 04    SURGERY DATE: 2019    Surgeon(s) and Role:     * Sayra Hernandez MD - Primary     * Chantal Worrell MD - Assisting     * Denis Laws MD - Observing    Preop Diagnosis:  Ventral hernia without obstruction or gangrene [K43 9]    Post-Op Diagnosis Codes: * Ventral hernia without obstruction or gangrene [K43 9]     Procedure(s) (LRB):  VENTRAL HERNIA REPAIR (N/A)  with mesh    Specimen(s):  * No specimens in log *    Estimated Blood Loss:   Minimal    Drains:  * No LDAs found *    Anesthesia Type:   General    Operative Indications:  Ventral hernia without obstruction or gangrene [K43 9]      Operative Findings:    Independent, nonsmoker, ASA 2, wound class 1, BMI 36, weight 200, height 62  Cardiovascular  EKG reviewed, Exercise tolerance (METS): >4,  Hyperlipidemia,     Pulmonary  Negative pulmonary ROS          GI/Hepatic  Negative GI/hepatic ROS             Negative  ROS          Endo/Other     Comment: Trisomy 21     GYN         Hematology  Negative hematology ROS        Musculoskeletal     Comment: Ventral Hernia       Neurology  Negative neurology ROS        Psychology        Comment: Intellectual Disability         Complications:   None    Procedure and Technique:  Patient was identified visually and via armband  Placed in the supine position  After general anesthesia the abdomen was prepped and draped in a sterile fashion  0 25% Marcaine with epinephrine was utilized  Incision was made above the umbilicus  This carried down through skin subcutaneous tissue  The hernia sac was dissected free  The sac was amputated  A polypropylene mesh was then inserted  This was affixed to the anterior abdominal wall with interrupted 1  Vicryl suture  Left known was then closed with 3 0 Vicryl subcutaneous and 4 Monocryl sutures  Patient tolerated this procedure well    Sponge instrument count correct x2     I was present for the entire procedure    Patient Disposition:  PACU     SIGNATURE: Jose Louie MD  DATE: November 6, 2019  TIME: 8:23 AM

## 2019-11-06 NOTE — ANESTHESIA PREPROCEDURE EVALUATION
Review of Systems/Medical History  Patient summary reviewed  Chart reviewed  No history of anesthetic complications (No family h/o anesthetic complications)     Cardiovascular  EKG reviewed, Exercise tolerance (METS): >4,  Hyperlipidemia,    Pulmonary  Negative pulmonary ROS        GI/Hepatic  Negative GI/hepatic ROS          Negative  ROS        Endo/Other    Comment: Trisomy 21    GYN       Hematology  Negative hematology ROS      Musculoskeletal    Comment: Ventral Hernia      Neurology  Negative neurology ROS      Psychology       Comment: Intellectual Disability         Physical Exam    Airway    Mallampati score: II  TM Distance: >3 FB  Neck ROM: full     Dental       Cardiovascular  Rhythm: regular, Rate: normal, Cardiovascular exam normal    Pulmonary  Pulmonary exam normal Breath sounds clear to auscultation,     Other Findings        Anesthesia Plan  ASA Score- 2     Anesthesia Type- general with ASA Monitors  Additional Monitors:   Airway Plan: LMA  Plan Factors-    Induction- intravenous  Postoperative Plan- Plan for postoperative opioid use  Planned trial extubation    Informed Consent- Anesthetic plan and risks discussed with patient

## 2019-11-06 NOTE — PROGRESS NOTES
OOB to rest room  Urinated without difficulty  Ambulating without difficulty  Caretaker at bedside   Awaiting Dr Renard Hughes for discharge orders

## 2019-11-06 NOTE — DISCHARGE INSTRUCTIONS
Diet and activity as tolerated  May shower  Please consider milk a magnesia daily in order to help prevent constipation  Please call [phone] for a follow-up office visit for 2 weeks  [address], [address], [address]   [address] between [address] and [employer]

## 2019-11-06 NOTE — INTERVAL H&P NOTE
H&P reviewed  After examining the patient I find no changes in the patients condition since the H&P had been written      Vitals:    11/06/19 0654   BP: 154/75   Pulse: (!) 51   Resp: 20   Temp: 98 °F (36 7 °C)   SpO2: 97%

## 2020-07-21 ENCOUNTER — TELEPHONE (OUTPATIENT)
Dept: FAMILY MEDICINE CLINIC | Facility: CLINIC | Age: 35
End: 2020-07-21

## 2020-07-22 ENCOUNTER — OFFICE VISIT (OUTPATIENT)
Dept: FAMILY MEDICINE CLINIC | Facility: CLINIC | Age: 35
End: 2020-07-22

## 2020-07-22 VITALS
HEIGHT: 62 IN | WEIGHT: 198 LBS | TEMPERATURE: 97.7 F | RESPIRATION RATE: 16 BRPM | BODY MASS INDEX: 36.44 KG/M2 | HEART RATE: 82 BPM | SYSTOLIC BLOOD PRESSURE: 110 MMHG | DIASTOLIC BLOOD PRESSURE: 80 MMHG

## 2020-07-22 DIAGNOSIS — Q90.9 COMPLETE TRISOMY 21 SYNDROME: ICD-10-CM

## 2020-07-22 DIAGNOSIS — K43.9 VENTRAL HERNIA WITHOUT OBSTRUCTION OR GANGRENE: ICD-10-CM

## 2020-07-22 DIAGNOSIS — Z00.00 HEALTH MAINTENANCE EXAMINATION: ICD-10-CM

## 2020-07-22 DIAGNOSIS — F79 INTELLECTUAL FUNCTIONING DISABILITY: ICD-10-CM

## 2020-07-22 DIAGNOSIS — H61.23 BILATERAL IMPACTED CERUMEN: Primary | ICD-10-CM

## 2020-07-22 DIAGNOSIS — B35.3 TINEA PEDIS OF LEFT FOOT: ICD-10-CM

## 2020-07-22 DIAGNOSIS — E78.49 OTHER HYPERLIPIDEMIA: ICD-10-CM

## 2020-07-22 PROCEDURE — 1036F TOBACCO NON-USER: CPT | Performed by: FAMILY MEDICINE

## 2020-07-22 PROCEDURE — 3008F BODY MASS INDEX DOCD: CPT | Performed by: FAMILY MEDICINE

## 2020-07-22 PROCEDURE — 99214 OFFICE O/P EST MOD 30 MIN: CPT | Performed by: FAMILY MEDICINE

## 2020-07-22 NOTE — ASSESSMENT & PLAN NOTE
Hyperlipidemia controlled    Last CBC was abnormal (hemoglobin 18 4)   - check lipid panel   - check CMP   - repeat CBC

## 2020-07-22 NOTE — PROGRESS NOTES
Assessment/Plan:         Problem List Items Addressed This Visit        Nervous and Auditory    Bilateral impacted cerumen - Primary    Relevant Medications    carbamide peroxide (DEBROX) 6 5 % otic solution       Musculoskeletal and Integument    Tinea pedis of left foot     Bilateral tinea pedis improved since last visit   - continue applying terbinafine cream            Other    Complete trisomy 21 syndrome    Intellectual functioning disability    Other hyperlipidemia     Hyperlipidemia controlled  Last CBC was abnormal (hemoglobin 18 4)   - check lipid panel   - check CMP   - repeat CBC         Relevant Orders    Lipid panel    Ventral hernia without obstruction or gangrene     Ventral hernia status post repaired 2019  Patient has no complaint and is asymptomatic         Health maintenance examination     Patient appears well overall    - RIGHT ear = fail hearing test above 4000Mhz range  LEFT ear = Patient cannot hear anything on hearing test  Possibly due to cerumen impaction  Has prescribed DEBROX BID  Will follow-up next week to for cerumen removal  If no improvement post cerumen removal, will refer to ENT  Subjective:      Patient ID: Rolan Wolf is a 37 y o  male  78-year-old male with past medical history of Down syndrome present with caretaker for physical checkup  Patient has no complaint and caretaker has no concern at the moment  The following portions of the patient's history were reviewed and updated as appropriate: allergies, current medications, past family history, past medical history, past social history, past surgical history and problem list     Review of Systems   Constitutional: Negative for activity change, appetite change and fever  HENT: Negative for ear discharge, ear pain, facial swelling, hearing loss (Patient does not complain of any hearing changes), postnasal drip, rhinorrhea, sore throat, tinnitus and voice change      Eyes: Negative for pain, discharge, redness and visual disturbance  Respiratory: Negative for apnea, cough, choking, chest tightness, shortness of breath, wheezing and stridor  Cardiovascular: Negative for chest pain and palpitations  Gastrointestinal: Negative for abdominal distention, abdominal pain, constipation, diarrhea, nausea and vomiting  Genitourinary: Negative for dysuria  Musculoskeletal: Negative for arthralgias, neck pain and neck stiffness  Neurological: Negative for tremors, seizures and weakness  Psychiatric/Behavioral: Negative for agitation, behavioral problems and confusion  Objective:      /80   Pulse 82   Temp 97 7 °F (36 5 °C)   Resp 16   Ht 5' 2" (1 575 m)   Wt 89 8 kg (198 lb)   BMI 36 21 kg/m²          Physical Exam   Constitutional: He is oriented to person, place, and time  He appears well-developed and well-nourished  HENT:   Head: Normocephalic and atraumatic  Ears:    Nose: Nose normal    Eyes: Pupils are equal, round, and reactive to light  Conjunctivae are normal  Right eye exhibits no discharge  Left eye exhibits no discharge  Neck: Normal range of motion  Neck supple  Cardiovascular: Normal rate, regular rhythm and normal heart sounds  Pulmonary/Chest: Effort normal and breath sounds normal  No stridor  No respiratory distress  He has no wheezes  He has no rales  Abdominal: Soft  Bowel sounds are normal  He exhibits no distension  There is no tenderness  There is no rebound  Musculoskeletal: Normal range of motion  Bilateral tenia pedis   Neurological: He is alert and oriented to person, place, and time  Psychiatric: He has a normal mood and affect   His behavior is normal  Judgment and thought content normal

## 2020-07-25 PROBLEM — H91.92 HEARING LOSS OF LEFT EAR: Status: ACTIVE | Noted: 2020-07-25

## 2020-07-25 PROBLEM — H61.23 BILATERAL IMPACTED CERUMEN: Status: ACTIVE | Noted: 2020-07-25

## 2020-07-25 PROBLEM — Z00.00 HEALTH MAINTENANCE EXAMINATION: Status: ACTIVE | Noted: 2020-07-25

## 2020-07-25 NOTE — ASSESSMENT & PLAN NOTE
Patient appears well overall    - RIGHT ear = fail hearing test above 4000Mhz range  LEFT ear = Patient cannot hear anything on hearing test  Possibly due to cerumen impaction  Has prescribed DEBROX BID  Will follow-up next week to for cerumen removal  If no improvement post cerumen removal, will refer to ENT

## 2020-08-10 ENCOUNTER — TELEPHONE (OUTPATIENT)
Dept: FAMILY MEDICINE CLINIC | Facility: CLINIC | Age: 35
End: 2020-08-10

## 2020-08-11 ENCOUNTER — OFFICE VISIT (OUTPATIENT)
Dept: FAMILY MEDICINE CLINIC | Facility: CLINIC | Age: 35
End: 2020-08-11

## 2020-08-11 ENCOUNTER — TELEPHONE (OUTPATIENT)
Dept: FAMILY MEDICINE CLINIC | Facility: CLINIC | Age: 35
End: 2020-08-11

## 2020-08-11 VITALS
TEMPERATURE: 97.7 F | HEART RATE: 78 BPM | RESPIRATION RATE: 16 BRPM | BODY MASS INDEX: 36.84 KG/M2 | WEIGHT: 200.2 LBS | DIASTOLIC BLOOD PRESSURE: 90 MMHG | HEIGHT: 62 IN | SYSTOLIC BLOOD PRESSURE: 122 MMHG

## 2020-08-11 DIAGNOSIS — Z11.1 PPD SCREENING TEST: Primary | ICD-10-CM

## 2020-08-11 DIAGNOSIS — H61.23 BILATERAL IMPACTED CERUMEN: ICD-10-CM

## 2020-08-11 PROCEDURE — 3008F BODY MASS INDEX DOCD: CPT | Performed by: FAMILY MEDICINE

## 2020-08-11 PROCEDURE — 86580 TB INTRADERMAL TEST: CPT | Performed by: FAMILY MEDICINE

## 2020-08-11 PROCEDURE — 1036F TOBACCO NON-USER: CPT | Performed by: FAMILY MEDICINE

## 2020-08-11 PROCEDURE — 99213 OFFICE O/P EST LOW 20 MIN: CPT | Performed by: FAMILY MEDICINE

## 2020-08-11 NOTE — TELEPHONE ENCOUNTER
Form in triage bin to be update per agency, please place back in bin ppd read needs to be filled out on Thursday by MA     Thank you

## 2020-08-11 NOTE — PROGRESS NOTES
Assessment/Plan:    Bilateral impacted cerumen  -Resolved after bilateral ear flush  Patient admits hearing much better after ear flush and no longer feels ears are clogged  Proper ear hygiene precautions discussed  -Follow-up as needed for impacted cerumen         Problem List Items Addressed This Visit        Nervous and Auditory    Bilateral impacted cerumen     -Resolved after bilateral ear flush  Patient admits hearing much better after ear flush and no longer feels ears are clogged  Proper ear hygiene precautions discussed  -Follow-up as needed for impacted cerumen           Other Visit Diagnoses     PPD screening test    -  Primary    Relevant Orders    TB Skin Test (Completed)            Subjective:      Patient ID: Marzena Hopper is a 37 y o  male  Today's visit it to  evaluate for impacted cerumen and possible ear flush  He was seen a few weeks ago and was given Debrox drops  According to medical assistant accompanying him today he has been using Debrox daily  At last visit he admitted some hearing loss  Has improved slightly with Debrox  The following portions of the patient's history were reviewed and updated as appropriate: allergies, current medications, past family history, past medical history, past social history, past surgical history and problem list     Review of Systems   Constitutional: Negative for chills and fever  HENT: Positive for hearing loss  Negative for congestion, ear discharge, ear pain, facial swelling, postnasal drip, sinus pressure, sinus pain, sneezing, sore throat and voice change  Objective:      /90   Pulse 78   Temp 97 7 °F (36 5 °C)   Resp 16   Ht 5' 2" (1 575 m)   Wt 90 8 kg (200 lb 3 2 oz)   BMI 36 62 kg/m²          Physical Exam  Vitals signs and nursing note reviewed  Constitutional:       General: He is not in acute distress  Appearance: Normal appearance  He is not ill-appearing or diaphoretic     HENT:      Head: Normocephalic and atraumatic  Comments: Bilateral impacted cerumen initially however after ear flush cerumen cleared TMs normal     Mouth/Throat:      Mouth: Mucous membranes are moist       Pharynx: No oropharyngeal exudate or posterior oropharyngeal erythema  Eyes:      Extraocular Movements: Extraocular movements intact  Conjunctiva/sclera: Conjunctivae normal    Cardiovascular:      Rate and Rhythm: Normal rate and regular rhythm  Pulmonary:      Effort: Pulmonary effort is normal       Breath sounds: Normal breath sounds  Neurological:      Mental Status: He is alert and oriented to person, place, and time     Psychiatric:         Mood and Affect: Mood normal

## 2020-08-11 NOTE — ASSESSMENT & PLAN NOTE
-Resolved after bilateral ear flush  Patient admits hearing much better after ear flush and no longer feels ears are clogged    Proper ear hygiene precautions discussed  -Follow-up as needed for impacted cerumen

## 2020-08-12 NOTE — TELEPHONE ENCOUNTER
I have tried to contact patient/caretaker via provided phone number [phone] and left voicemail to call back to office [phone] regarding patient's ability to take medication on his own  Will complete the form once this information is verified  Thank you

## 2020-08-13 ENCOUNTER — CLINICAL SUPPORT (OUTPATIENT)
Dept: FAMILY MEDICINE CLINIC | Facility: CLINIC | Age: 35
End: 2020-08-13

## 2020-08-13 ENCOUNTER — TELEPHONE (OUTPATIENT)
Dept: FAMILY MEDICINE CLINIC | Facility: CLINIC | Age: 35
End: 2020-08-13

## 2020-08-13 DIAGNOSIS — Z11.1 ENCOUNTER FOR PPD SKIN TEST READING: Primary | ICD-10-CM

## 2020-08-13 LAB
INDURATION: 0 MM
TB SKIN TEST: NEGATIVE

## 2020-08-13 NOTE — TELEPHONE ENCOUNTER
Patient is able to take his own medication per the caretaker who accompanied him ti his PPD read, paperwork not in triage folder, unable to complete paper to provide to caretaker

## 2020-08-18 ENCOUNTER — OFFICE VISIT (OUTPATIENT)
Dept: NEUROLOGY | Facility: CLINIC | Age: 35
End: 2020-08-18
Payer: COMMERCIAL

## 2020-08-18 VITALS
HEART RATE: 58 BPM | OXYGEN SATURATION: 96 % | HEIGHT: 62 IN | BODY MASS INDEX: 36.44 KG/M2 | RESPIRATION RATE: 16 BRPM | SYSTOLIC BLOOD PRESSURE: 132 MMHG | WEIGHT: 198 LBS | DIASTOLIC BLOOD PRESSURE: 86 MMHG | TEMPERATURE: 97.5 F

## 2020-08-18 DIAGNOSIS — Q90.9 COMPLETE TRISOMY 21 SYNDROME: Primary | ICD-10-CM

## 2020-08-18 PROCEDURE — 1036F TOBACCO NON-USER: CPT | Performed by: PSYCHIATRY & NEUROLOGY

## 2020-08-18 PROCEDURE — 3008F BODY MASS INDEX DOCD: CPT | Performed by: PSYCHIATRY & NEUROLOGY

## 2020-08-18 PROCEDURE — 3008F BODY MASS INDEX DOCD: CPT | Performed by: FAMILY MEDICINE

## 2020-08-18 PROCEDURE — 99213 OFFICE O/P EST LOW 20 MIN: CPT | Performed by: PSYCHIATRY & NEUROLOGY

## 2020-08-18 NOTE — PROGRESS NOTES
Patient ID: Velia Khoury is a 37 y o  male  Assessment/Plan:    Complete trisomy 21 syndrome  78-year-old with Down syndrome and a family history of Alzheimer's disease presents in follow-up  Reported stability from the patient and his caregiver over the past year  Stable performance on Waisman ADL scale and MMSE  No concern at this time for the development of neurodegenerative disease on top of his intellectual disability  In the future if the patient's MMSE and Waisman declined significantly I would recommend repeating his reversible dementia labs and MRI  Today   MMSE 20/30  Waisman ADLs 29 (24, 5, 0)      follow up in 1 year      Diagnoses and all orders for this visit:    Complete trisomy 21 syndrome           Subjective:    HPI  I had the pleasure of seeing your patient, Velia Khoury in the [facility] at the [facility]  To rewview, Velia Khoury is a 78-year-old man with Down's syndrome an a positive family history of Alzheimer's disease who presents for an annual follow-up regarding his cognitive function  The patient is accompanied by his caretaker with whom he lives  He had followed with Dr Ric Munoz in the past last seen by her in in August of 2018  At that time he scored a 23/30 on the MMSE  He did undergo an MRI of the brain with NQ in 2017 which showed normal hippocampal volumes  The state has requested an annual follow-up with a neurologist given his positive family history      6/25/19: Rashmi Hennessy ADLs 28 (24, 4, 1) MMSE 19/30   *the waisman score is a correction from last time  Interval history:  The patient is accompanied by his caretaker with whom he lives  They deny any changes in his cognitive function or activities of daily living  The patient denies any changes in his symptoms  He was previously working at a [employer] but is no longer doing this because of COVID  He misses working but otherwise has no complaints    Denies any issues at home  No concerns from his caregiver  The following portions of the patient's history were reviewed and updated as appropriate: allergies, current medications, past family history, past medical history, past social history, past surgical history and problem list          Objective:  Blood pressure 132/86, pulse 58, temperature 97 5 °F (36 4 °C), resp  rate 16, height 5' 2" (1 575 m), weight 89 8 kg (198 lb), SpO2 96 %  Physical Exam    Neurological Exam    GENERAL MEDICAL EXAMINATION:  General appearance: alert, in no apparent distress  Appropriately dressed and groomed  Conversing and interacting appropriately  Eyes: Sclera are non-injected  Ears, nose, Mouth, Throat: Mucous membranes are moist    Resp: Breathing comfortably on RA   Musculoskeletal: No evidence of deformities  No contractures  No Edema  Skin: No visible rashes  Warm and well perfused  Psych: normal and appropriate affect     Mental Status:  Attentive to conversation  Language is fluent and appropriate with normal prosody  There were no paraphasic errors  Speech was not dysarthric  Able to follow both midline and appendicular commands  MMSE 20/30    General Neurology examination:   PERRL, EOMI, Face activates symmetrically  Normal bulk and tone throughout  Patient moves all 4 extremities equally and antigravity  No pronator drift  There were no adventitious movements noted  Finger to nose without dysmetria bilaterally  No intention tremor  RACQUEL are symmetric but somewhat effortful  Gait:   Able to rise from a chair without the use of arms  Posture was normal  Narrow-base and stable stance  Normal initiation  Turn completed in 2 steps  Romberg is negative  ROS:  Review of Systems   Constitutional: Negative  Negative for appetite change and fever  HENT: Negative  Negative for hearing loss, tinnitus, trouble swallowing and voice change  Eyes: Negative  Negative for photophobia and pain     Respiratory: Negative  Negative for shortness of breath  Cardiovascular: Negative  Negative for palpitations  Gastrointestinal: Negative  Negative for nausea and vomiting  Endocrine: Negative  Negative for cold intolerance  Genitourinary: Negative  Negative for dysuria, frequency and urgency  Musculoskeletal: Negative  Negative for myalgias and neck pain  Skin: Negative  Negative for rash  Allergic/Immunologic: Negative  Neurological: Negative  Negative for dizziness, tremors, seizures, syncope, facial asymmetry, speech difficulty, weakness, light-headedness, numbness and headaches  Hematological: Negative  Does not bruise/bleed easily  Psychiatric/Behavioral: Negative for confusion, hallucinations and sleep disturbance  Memory problems   All other systems reviewed and are negative  The above ROS was reviewed and updated       Tc Kessler MD  Medical Director   [facility]  [facility]

## 2020-08-18 NOTE — ASSESSMENT & PLAN NOTE
40-year-old with Down syndrome and a family history of Alzheimer's disease presents in follow-up  Reported stability from the patient and his caregiver over the past year  Stable performance on Waisman ADL scale and MMSE  No concern at this time for the development of neurodegenerative disease on top of his intellectual disability  In the future if the patient's MMSE and Waisman declined significantly I would recommend repeating his reversible dementia labs and MRI      Today   MMSE 20/30  Waisman ADLs 29 (24, 5, 0)

## 2020-08-19 ENCOUNTER — TELEPHONE (OUTPATIENT)
Dept: FAMILY MEDICINE CLINIC | Facility: CLINIC | Age: 35
End: 2020-08-19

## 2020-08-19 NOTE — TELEPHONE ENCOUNTER
Paperwork dropped off my agency because some of the paperwork wasn't filled out properly and they used whiteout which isnt allowed by the agency, placed in Dr Roz Maradiaga bin

## 2020-11-04 ENCOUNTER — OFFICE VISIT (OUTPATIENT)
Dept: FAMILY MEDICINE CLINIC | Facility: CLINIC | Age: 35
End: 2020-11-04

## 2020-11-04 VITALS
HEIGHT: 62 IN | WEIGHT: 196.6 LBS | TEMPERATURE: 96.4 F | RESPIRATION RATE: 18 BRPM | DIASTOLIC BLOOD PRESSURE: 80 MMHG | OXYGEN SATURATION: 96 % | SYSTOLIC BLOOD PRESSURE: 130 MMHG | HEART RATE: 93 BPM | BODY MASS INDEX: 36.18 KG/M2

## 2020-11-04 DIAGNOSIS — M79.672 LEFT FOOT PAIN: Primary | ICD-10-CM

## 2020-11-04 DIAGNOSIS — F79 INTELLECTUAL FUNCTIONING DISABILITY: ICD-10-CM

## 2020-11-04 DIAGNOSIS — Z11.4 SCREENING FOR HIV (HUMAN IMMUNODEFICIENCY VIRUS): ICD-10-CM

## 2020-11-04 DIAGNOSIS — B35.3 TINEA PEDIS OF BOTH FEET: ICD-10-CM

## 2020-11-04 DIAGNOSIS — B35.1 TINEA UNGUIUM: ICD-10-CM

## 2020-11-04 DIAGNOSIS — Q90.9 COMPLETE TRISOMY 21 SYNDROME: ICD-10-CM

## 2020-11-04 PROCEDURE — 99214 OFFICE O/P EST MOD 30 MIN: CPT | Performed by: FAMILY MEDICINE

## 2020-11-04 PROCEDURE — 1036F TOBACCO NON-USER: CPT | Performed by: FAMILY MEDICINE

## 2020-11-19 ENCOUNTER — OFFICE VISIT (OUTPATIENT)
Dept: PODIATRY | Facility: CLINIC | Age: 35
End: 2020-11-19
Payer: COMMERCIAL

## 2020-11-19 VITALS
SYSTOLIC BLOOD PRESSURE: 135 MMHG | BODY MASS INDEX: 36.33 KG/M2 | HEART RATE: 60 BPM | DIASTOLIC BLOOD PRESSURE: 85 MMHG | WEIGHT: 197.4 LBS | HEIGHT: 62 IN

## 2020-11-19 DIAGNOSIS — B35.3 TINEA PEDIS OF BOTH FEET: ICD-10-CM

## 2020-11-19 PROCEDURE — 99202 OFFICE O/P NEW SF 15 MIN: CPT | Performed by: PODIATRIST

## 2020-11-19 PROCEDURE — 3008F BODY MASS INDEX DOCD: CPT | Performed by: PODIATRIST

## 2020-11-19 PROCEDURE — 3008F BODY MASS INDEX DOCD: CPT | Performed by: FAMILY MEDICINE

## 2020-11-19 PROCEDURE — 1036F TOBACCO NON-USER: CPT | Performed by: PODIATRIST

## 2020-11-19 RX ORDER — CLOTRIMAZOLE AND BETAMETHASONE DIPROPIONATE 10; .64 MG/G; MG/G
CREAM TOPICAL 2 TIMES DAILY
Qty: 30 G | Refills: 0 | Status: SHIPPED | OUTPATIENT
Start: 2020-11-19

## 2021-01-14 ENCOUNTER — OFFICE VISIT (OUTPATIENT)
Dept: PODIATRY | Facility: CLINIC | Age: 36
End: 2021-01-14
Payer: COMMERCIAL

## 2021-01-14 VITALS — BODY MASS INDEX: 36.99 KG/M2 | HEIGHT: 62 IN | WEIGHT: 201 LBS

## 2021-01-14 DIAGNOSIS — B35.3 TINEA PEDIS OF BOTH FEET: Primary | ICD-10-CM

## 2021-01-14 PROCEDURE — 1036F TOBACCO NON-USER: CPT | Performed by: PODIATRIST

## 2021-01-14 PROCEDURE — 99212 OFFICE O/P EST SF 10 MIN: CPT | Performed by: PODIATRIST

## 2021-01-14 PROCEDURE — 3008F BODY MASS INDEX DOCD: CPT | Performed by: PODIATRIST

## 2021-01-14 NOTE — PROGRESS NOTES
Patient presents with an aide  Patient has responded well to Lotrisone cream and there is no evidence of tinea at this time  Patient may discontinue the Lotrisone  All toenails are elongated  Treatment consisted of nail trimming  Patient is rescheduled in 10 weeks

## 2021-03-10 DIAGNOSIS — Z23 ENCOUNTER FOR IMMUNIZATION: ICD-10-CM

## 2021-06-11 ENCOUNTER — TELEPHONE (OUTPATIENT)
Dept: FAMILY MEDICINE CLINIC | Facility: CLINIC | Age: 36
End: 2021-06-11

## 2021-06-11 NOTE — TELEPHONE ENCOUNTER
----- Message from Emmy Castro MD sent at 6/10/2021  3:17 PM EDT -----  Regarding: Please schedule MAW visit  Please help me schedule MAW visit with patient  Need to address care-gaps as well  Last physically seen in office by Tom Leonard was 11/4/20 for foot pain  Thank you!

## 2021-07-19 ENCOUNTER — OFFICE VISIT (OUTPATIENT)
Dept: PODIATRY | Facility: CLINIC | Age: 36
End: 2021-07-19
Payer: COMMERCIAL

## 2021-07-19 VITALS — SYSTOLIC BLOOD PRESSURE: 137 MMHG | DIASTOLIC BLOOD PRESSURE: 83 MMHG | HEART RATE: 80 BPM

## 2021-07-19 DIAGNOSIS — L60.3 NAIL DYSTROPHY: Primary | ICD-10-CM

## 2021-07-19 PROCEDURE — 1036F TOBACCO NON-USER: CPT | Performed by: PODIATRIST

## 2021-07-19 PROCEDURE — 99212 OFFICE O/P EST SF 10 MIN: CPT | Performed by: PODIATRIST

## 2021-07-19 NOTE — PROGRESS NOTES
Patient presents with multiple elongated toenails  No evidence of tinea pedis  Pedal pulses are within normal limits  Treatment consisted of nail trimming

## 2021-07-23 ENCOUNTER — RA CDI HCC (OUTPATIENT)
Dept: OTHER | Facility: HOSPITAL | Age: 36
End: 2021-07-23

## 2021-07-23 NOTE — PROGRESS NOTES
[facility] [employer]  coding opportunities             Chart reviewed, (number of) suggestions sent to provider: 1            Number of suggestions actually used: 0      Number of suggestions NOT actually used: 1     Patients insurance company: [employer] (Medicare Advantage and Commercial)     Visit status: Patient arrived for their scheduled appointment   used [id number] sent not on bill  Provider never responded to [employer]  coding request     [facility] [employer]  coding opportunities             Chart reviewed, (number of) suggestions sent to provider: 1   DX:  E66 01-Morbid (severe) obesity due to excess [id number] with hyperlipidemia    Not clinically appropriate to ask for alzheimer's per bpa data-patient has a family hx only and not confirmed in neurology notes-has not been dx with this-LM               Patients insurance company: [employer] ([employer])

## 2021-07-30 ENCOUNTER — OFFICE VISIT (OUTPATIENT)
Dept: FAMILY MEDICINE CLINIC | Facility: CLINIC | Age: 36
End: 2021-07-30

## 2021-07-30 VITALS
HEIGHT: 62 IN | TEMPERATURE: 98.5 F | WEIGHT: 204 LBS | SYSTOLIC BLOOD PRESSURE: 128 MMHG | BODY MASS INDEX: 37.54 KG/M2 | RESPIRATION RATE: 16 BRPM | HEART RATE: 68 BPM | DIASTOLIC BLOOD PRESSURE: 80 MMHG

## 2021-07-30 DIAGNOSIS — E66.09 CLASS 2 OBESITY DUE TO EXCESS CALORIES WITHOUT SERIOUS COMORBIDITY WITH BODY MASS INDEX (BMI) OF 37.0 TO 37.9 IN ADULT: ICD-10-CM

## 2021-07-30 DIAGNOSIS — G89.29 CHRONIC BILATERAL LOW BACK PAIN WITHOUT SCIATICA: ICD-10-CM

## 2021-07-30 DIAGNOSIS — H61.23 BILATERAL IMPACTED CERUMEN: ICD-10-CM

## 2021-07-30 DIAGNOSIS — Z00.00 MEDICARE ANNUAL WELLNESS VISIT, SUBSEQUENT: Primary | ICD-10-CM

## 2021-07-30 DIAGNOSIS — M54.50 CHRONIC BILATERAL LOW BACK PAIN WITHOUT SCIATICA: ICD-10-CM

## 2021-07-30 PROCEDURE — G0439 PPPS, SUBSEQ VISIT: HCPCS | Performed by: FAMILY MEDICINE

## 2021-07-30 PROCEDURE — 3008F BODY MASS INDEX DOCD: CPT | Performed by: FAMILY MEDICINE

## 2021-07-30 PROCEDURE — 3008F BODY MASS INDEX DOCD: CPT | Performed by: PODIATRIST

## 2021-07-30 PROCEDURE — 3725F SCREEN DEPRESSION PERFORMED: CPT | Performed by: FAMILY MEDICINE

## 2021-07-30 PROCEDURE — 1036F TOBACCO NON-USER: CPT | Performed by: FAMILY MEDICINE

## 2021-07-30 PROCEDURE — 99213 OFFICE O/P EST LOW 20 MIN: CPT | Performed by: FAMILY MEDICINE

## 2021-07-30 NOTE — PATIENT INSTRUCTIONS

## 2021-07-30 NOTE — PROGRESS NOTES
[address] Sullivan 38 y o  male MRN: [id number]  Encounter: [id number],  Primary Care Provider: Magdaleno Mallory MD       Assessment and Plan:     Problem List Items Addressed This Visit        Nervous and Auditory    Bilateral impacted cerumen    Relevant Medications    carbamide peroxide (DEBROX) 6 5 % otic solution       Other    Class 2 obesity due to excess calories without serious comorbidity with body mass index (BMI) of 37 0 to 37 9 in adult       - Squats, push-up, sit-up  - Every day  -          Relevant Orders    CBC and Platelet    Comprehensive metabolic panel    TSH, 3rd generation with Free T4 reflex    Lipid Panel with Direct LDL reflex    HEMOGLOBIN A1C W/ EAG ESTIMATION    Medicare annual wellness visit, subsequent - Primary    Chronic bilateral low back pain without sciatica        BMI Counseling: Body mass index is 37 31 kg/m²  The BMI is above normal  Nutrition recommendations include decreasing portion sizes, encouraging healthy choices of fruits and vegetables, decreasing fast food intake, consuming healthier snacks, limiting drinks that contain sugar, moderation in carbohydrate intake, increasing intake of lean protein, reducing intake of saturated and trans fat and reducing intake of cholesterol  Exercise recommendations include moderate physical activity 150 minutes/week, vigorous physical activity 75 minutes/week, exercising 3-5 times per week, obtaining a gym membership and strength training exercises  No pharmacotherapy was ordered  Preventive health issues were discussed with patient, and age appropriate screening tests were ordered as noted in patient's After Visit Summary  Personalized health advice and appropriate referrals for health education or preventive services given if needed, as noted in patient's After Visit Summary  History of Present Illness:     Patient presents for Welcome to Medicare visit  Patient Care Team:  Jericho Goodrich MD as PCP - General (Family Medicine)  MD Alejandro Quinones MD     Review of Systems:     Denies any fever, chill, excessive fatigue, HA, dizziness, N/V, CP, SOB, abdominal pain, dysuria, C/D, or extremity weakness/paraesthesia     Problem List:     Patient Active Problem List   Diagnosis    Complete trisomy 21 syndrome    Intellectual functioning disability    Class 2 obesity due to excess calories without serious comorbidity with body mass index (BMI) of 37 0 to 37 9 in adult    Other hyperlipidemia    Acute seasonal allergic rhinitis, unspecified trigger    Alzheimer's disease, early onset ([facility] )    Encounter for immunization    Tinea unguium    Ventral hernia without obstruction or gangrene    Calculus of gallbladder with chronic cholecystitis without obstruction    Dry skin    Medicare annual wellness visit, subsequent    Bilateral impacted cerumen    Left foot pain    Chronic bilateral low back pain without sciatica      Past Medical and Surgical History:     Past Medical History:   Diagnosis Date    Down's syndrome     Spinal pain      Past Surgical History:   Procedure Laterality Date    NO PAST SURGERIES      CO REPAIR INCISIONAL HERNIA,REDUCIBLE N/A 11/6/2019    Procedure: VENTRAL HERNIA REPAIR;  Surgeon: Joseph Abarca MD;  [facility];  Service: General      Family History:     Family History   Problem Relation Age of Onset    No Known Problems Mother     No Known Problems Father       Social History:     Social History     Socioeconomic History    Marital status: Single     Spouse name: None    Number of children: None    Years of education: None    Highest education level: None   Occupational History    None   Tobacco Use    Smoking status: Never Smoker    Smokeless tobacco: Never Used   Vaping Use    Vaping Use: Never used   Substance and Sexual Activity    Alcohol use: Not Currently     Comment: social    Drug use: No    Sexual activity: Never   Other Topics Concern    None   Social History Narrative    None     Social Determinants of Health     Financial Resource Strain:     Difficulty of Paying Living Expenses:    Food Insecurity:     Worried About Running Out of Food in the Last Year:     [address] in the Last Year:    Transportation Needs:     Lack of Transportation (Medical):  Lack of Transportation (Non-Medical):    Physical Activity:     Days of Exercise per Week:     Minutes of Exercise per Session:    Stress:     Feeling of Stress :    Social Connections:     Frequency of Communication with Friends and Family:     Frequency of Social Gatherings with Friends and Family:     Attends [employer] Services:     Active Member of Clubs or Organizations:     Attends Club or Organization Meetings:     Marital Status:    Intimate Partner Violence:     Fear of Current or Ex-Partner:     Emotionally Abused:     Physically Abused:     Sexually Abused:       Medications and Allergies:     Current Outpatient Medications   Medication Sig Dispense Refill    carbamide peroxide (DEBROX) 6 5 % otic solution Administer 5 drops into both ears daily at bedtime 15 mL 0    Cetirizine HCl (ZYRTEC ALLERGY) 10 MG CAPS Take 1 tablet by mouth      Cholecalciferol (VITAMIN D3) 1 25 MG (52449 UT) CAPS Take by mouth      clotrimazole-betamethasone (LOTRISONE) 1-0 05 % cream Apply topically 2 (two) times a day 30 g 0    fluticasone (FLONASE) 50 mcg/act nasal spray 1 spray into each nostril daily      ibuprofen (MOTRIN) 600 mg tablet Take 1 tablet (600 mg total) by mouth every 6 (six) hours as needed for mild pain  30 tablet 0    white petrolatum ointment Apply topically 2 (two) times a day as needed for dry skin 106 g 2     No current facility-administered medications for this visit       No Known Allergies   Immunizations:     Immunization History   Administered Date(s) Administered    Influenza Quadrivalent Preservative Free 3 years and older IM 12/12/2017    Influenza, injectable, quadrivalent, preservative free 0 5 mL 02/08/2019, 10/29/2019    SARS-CoV-2 / COVID-19 mRNA IM (Pfizer-BioNTech) 04/07/2021    Tdap 03/19/2012    Tuberculin Skin Test-PPD Intradermal 08/21/2018, 08/11/2020      Health Maintenance:         Topic Date Due    Hepatitis C Screening  Never done    HIV Screening  Never done         Topic Date Due    COVID-19 Vaccine (2 - Pfizer 2-dose series) 04/28/2021    Influenza Vaccine (1) 09/01/2021      Medicare Screening Tests and Risk Assessments:     Noelle Cevallos is here for his Subsequent Wellness visit  Last Medicare Wellness visit information reviewed, patient interviewed, no change since last AWV  Last Medicare Wellness visit information reviewed, patient interviewed and updates made to the record today  Health Risk Assessment:   Patient rates overall health as good  Patient is satisfied with their life  Eyesight was rated as same  Hearing was rated as same  Patient feels that their emotional and mental health rating is same  Patient states they are never, rarely unusually tired/fatigued  Pain experienced in the last 7 days has been some  Patient's pain rating has been 3/10  Patient states that he has experienced no weight loss or gain in last 6 months  Depression Screening:   PHQ-2 Score: 0      Fall Risk Screening: In the past year, patient has experienced: no history of falling in past year      Home Safety:  Patient does not have trouble with stairs inside or outside of their home  Patient has working smoke alarms and has working carbon monoxide detector  Home safety hazards include: none  Nutrition:   Current diet is Regular  Medications:   Patient is not currently taking any over-the-counter supplements  Patient is not able to manage medications       Activities of Daily Living (ADLs)/Instrumental Activities of Daily Living (IADLs):   Walk and transfer into and out of bed and chair?: Yes  Dress and groom yourself?: Yes    Bathe or shower yourself?: Yes    Feed yourself? Yes  Do your laundry/housekeeping?: Yes  Manage your money, pay your bills and track your expenses?: Yes  Make your own meals?: Yes    Do your own shopping?: Yes    Advance Care Planning:   Living will: No    Durable POA for healthcare: No    Advanced directive: No    Advanced directive counseling given: No    Five wishes given: No    Patient declined ACP directive: No    End of Life Decisions reviewed with patient: No    Provider agrees with end of life decisions: No      PREVENTIVE SCREENINGS      Cardiovascular Screening:    General: History Lipid Disorder    Due for: Lipid Panel      Diabetes Screening:       Due for: Blood Glucose      Colorectal Cancer Screening:     General: Screening Not Indicated      Prostate Cancer Screening:    General: Screening Not Indicated      Osteoporosis Screening:    General: Screening Not Indicated      Abdominal Aortic Aneurysm (AAA) Screening:        General: Screening Not Indicated      Lung Cancer Screening:     General: Screening Not Indicated      Hepatitis C Screening:      Hep C Screening Accepted: Yes      Screening, Brief Intervention, and Referral to Treatment (SBIRT)    Screening  Typical number of drinks in a day: 0  Typical number of drinks in a week: 0  Interpretation: Low risk drinking behavior      Single Item Drug Screening:  How often have you used an illegal drug (including marijuana) or a prescription medication for non-medical reasons in the past year? never    Single Item Drug Screen Score: 0  Interpretation: Negative screen for possible drug use disorder    No exam data present     Physical Exam:     /80 (BP Location: Left arm, Patient Position: Sitting, Cuff Size: Large)   Pulse 68   Temp 98 5 °F (36 9 °C) (Temporal)   Resp 16   Ht 5' 2" (1 575 m)   Wt 92 5 kg (204 lb)   BMI 37 31 kg/m²     Physical Exam:    General: Pt observed sitting comfortably on exam table, NAD  Not toxic/ill-appearing  No cachectic or diaphoresis  No obvious sign of trauma or bleeding  Psych:  Agitated, anxious, AAOx4, able to converse appropriately  Neuro:  Neuro exam at baseline  Head: atraumatic, normocephalic  Eyes: open spontaneously, EOM intact, MARILYN, conjunctiva non-injected, no scleral icterus, no discharge  Ear: normal external ear, no visible drainage at external auditory orifice  Nose: clear, no epistaxis, no rhinorrhea  Throat: clear, no hoarse voice, no cough  Neck: supple, normal ROM  Heart: RRR, no murmur/distant heart sound appreciated  Lungs: LCTABL, nml respiratory effort, no agonal/labored breathing, no accessory muscle use  Abdomen: soft, nontender, nondistended, normal bowel sound  Extremities: +4 strengths b/l  Strong radial/pedal pulses  No weakness/paresthesia/edema  Maurice Rosenberg MD  PGY-2, Family Medicine  08/01/21  5:49 PM    Dear reader, please be aware that portions of my note contain dictated text  I have done my best to proof-read this note prior to signing  However, there may be occasional unnoticed errors pertaining to "sound-alike" words and/or grammar during my dictation process  If there is any words or information that is unclear or appears erroneous, please kindly let me know and I will clarify and/or addend my notes accordingly  Thank you for your understanding

## 2021-09-04 ENCOUNTER — APPOINTMENT (OUTPATIENT)
Dept: LAB | Facility: CLINIC | Age: 36
End: 2021-09-04
Payer: COMMERCIAL

## 2021-09-04 DIAGNOSIS — E66.09 CLASS 2 OBESITY DUE TO EXCESS CALORIES WITHOUT SERIOUS COMORBIDITY WITH BODY MASS INDEX (BMI) OF 37.0 TO 37.9 IN ADULT: ICD-10-CM

## 2021-09-04 LAB
ALBUMIN SERPL BCP-MCNC: 3 G/DL (ref 3.5–5)
ALP SERPL-CCNC: 58 U/L (ref 46–116)
ALT SERPL W P-5'-P-CCNC: 32 U/L (ref 12–78)
ANION GAP SERPL CALCULATED.3IONS-SCNC: 5 MMOL/L (ref 4–13)
AST SERPL W P-5'-P-CCNC: 18 U/L (ref 5–45)
BILIRUB SERPL-MCNC: 0.33 MG/DL (ref 0.2–1)
BUN SERPL-MCNC: 16 MG/DL (ref 5–25)
CALCIUM ALBUM COR SERPL-MCNC: 9.7 MG/DL (ref 8.3–10.1)
CALCIUM SERPL-MCNC: 8.9 MG/DL (ref 8.3–10.1)
CHLORIDE SERPL-SCNC: 106 MMOL/L (ref 100–108)
CHOLEST SERPL-MCNC: 253 MG/DL (ref 50–200)
CO2 SERPL-SCNC: 32 MMOL/L (ref 21–32)
CREAT SERPL-MCNC: 1.39 MG/DL (ref 0.6–1.3)
ERYTHROCYTE [DISTWIDTH] IN BLOOD BY AUTOMATED COUNT: 14 % (ref 11.6–15.1)
EST. AVERAGE GLUCOSE BLD GHB EST-MCNC: 103 MG/DL
GFR SERPL CREATININE-BSD FRML MDRD: 64 ML/MIN/1.73SQ M
GLUCOSE P FAST SERPL-MCNC: 85 MG/DL (ref 65–99)
HBA1C MFR BLD: 5.2 %
HCT VFR BLD AUTO: 54.3 % (ref 36.5–49.3)
HDLC SERPL-MCNC: 41 MG/DL
HGB BLD-MCNC: 18 G/DL (ref 12–17)
LDLC SERPL CALC-MCNC: 176 MG/DL (ref 0–100)
MCH RBC QN AUTO: 32.4 PG (ref 26.8–34.3)
MCHC RBC AUTO-ENTMCNC: 33.1 G/DL (ref 31.4–37.4)
MCV RBC AUTO: 98 FL (ref 82–98)
PLATELET # BLD AUTO: 297 THOUSANDS/UL (ref 149–390)
PMV BLD AUTO: 10.1 FL (ref 8.9–12.7)
POTASSIUM SERPL-SCNC: 4.1 MMOL/L (ref 3.5–5.3)
PROT SERPL-MCNC: 7.1 G/DL (ref 6.4–8.2)
RBC # BLD AUTO: 5.55 MILLION/UL (ref 3.88–5.62)
SODIUM SERPL-SCNC: 143 MMOL/L (ref 136–145)
TRIGL SERPL-MCNC: 179 MG/DL
TSH SERPL DL<=0.05 MIU/L-ACNC: 2.91 UIU/ML (ref 0.36–3.74)
WBC # BLD AUTO: 5.61 THOUSAND/UL (ref 4.31–10.16)

## 2021-09-04 PROCEDURE — 84443 ASSAY THYROID STIM HORMONE: CPT

## 2021-09-04 PROCEDURE — 80061 LIPID PANEL: CPT

## 2021-09-04 PROCEDURE — 80053 COMPREHEN METABOLIC PANEL: CPT

## 2021-09-04 PROCEDURE — 36415 COLL VENOUS BLD VENIPUNCTURE: CPT

## 2021-09-04 PROCEDURE — 83036 HEMOGLOBIN GLYCOSYLATED A1C: CPT

## 2021-09-04 PROCEDURE — 85027 COMPLETE CBC AUTOMATED: CPT

## 2021-09-28 ENCOUNTER — OFFICE VISIT (OUTPATIENT)
Dept: NEUROLOGY | Facility: CLINIC | Age: 36
End: 2021-09-28
Payer: COMMERCIAL

## 2021-09-28 VITALS
SYSTOLIC BLOOD PRESSURE: 125 MMHG | TEMPERATURE: 97.7 F | DIASTOLIC BLOOD PRESSURE: 75 MMHG | HEART RATE: 58 BPM | HEIGHT: 62 IN | BODY MASS INDEX: 37.73 KG/M2 | RESPIRATION RATE: 16 BRPM | WEIGHT: 205 LBS

## 2021-09-28 DIAGNOSIS — Q90.9 COMPLETE TRISOMY 21 SYNDROME: Primary | ICD-10-CM

## 2021-09-28 DIAGNOSIS — F79 INTELLECTUAL FUNCTIONING DISABILITY: ICD-10-CM

## 2021-09-28 DIAGNOSIS — Z82.0 FAMILY HISTORY OF ALZHEIMER'S DISEASE: ICD-10-CM

## 2021-09-28 PROCEDURE — 99212 OFFICE O/P EST SF 10 MIN: CPT | Performed by: NURSE PRACTITIONER

## 2022-01-04 ENCOUNTER — TELEPHONE (OUTPATIENT)
Dept: FAMILY MEDICINE CLINIC | Facility: CLINIC | Age: 37
End: 2022-01-04

## 2022-01-04 PROCEDURE — U0005 INFEC AGEN DETEC AMPLI PROBE: HCPCS | Performed by: FAMILY MEDICINE

## 2022-01-04 PROCEDURE — U0003 INFECTIOUS AGENT DETECTION BY NUCLEIC ACID (DNA OR RNA); SEVERE ACUTE RESPIRATORY SYNDROME CORONAVIRUS 2 (SARS-COV-2) (CORONAVIRUS DISEASE [COVID-19]), AMPLIFIED PROBE TECHNIQUE, MAKING USE OF HIGH THROUGHPUT TECHNOLOGIES AS DESCRIBED BY CMS-2020-01-R: HCPCS | Performed by: FAMILY MEDICINE

## 2022-01-04 NOTE — TELEPHONE ENCOUNTER
Spoke with Karen Prieto  Patient recently returned from travel  His work is requiring Covid test to return to work  Order entered

## 2022-01-04 NOTE — TELEPHONE ENCOUNTER
[employer] is calling and patient needs to get COVID test   No symptoms and not exposed to Jessica that [employer] is aware but cannot go back to work without a test   [employer] can be reached at [phone]

## 2022-01-29 ENCOUNTER — IMMUNIZATIONS (OUTPATIENT)
Dept: FAMILY MEDICINE CLINIC | Facility: HOSPITAL | Age: 37
End: 2022-01-29

## 2022-01-29 DIAGNOSIS — Z23 ENCOUNTER FOR IMMUNIZATION: Primary | ICD-10-CM

## 2022-01-29 PROCEDURE — 91300 COVID-19 PFIZER VACC 0.3 ML: CPT

## 2022-01-29 PROCEDURE — 0001A COVID-19 PFIZER VACC 0.3 ML: CPT

## 2022-04-07 ENCOUNTER — OFFICE VISIT (OUTPATIENT)
Dept: PODIATRY | Facility: CLINIC | Age: 37
End: 2022-04-07
Payer: COMMERCIAL

## 2022-04-07 VITALS
HEART RATE: 61 BPM | BODY MASS INDEX: 38.42 KG/M2 | SYSTOLIC BLOOD PRESSURE: 130 MMHG | WEIGHT: 208.8 LBS | HEIGHT: 62 IN | DIASTOLIC BLOOD PRESSURE: 90 MMHG

## 2022-04-07 DIAGNOSIS — B35.3 TINEA PEDIS OF BOTH FEET: Primary | ICD-10-CM

## 2022-04-07 DIAGNOSIS — B35.1 ONYCHOMYCOSIS: ICD-10-CM

## 2022-04-07 PROCEDURE — 1036F TOBACCO NON-USER: CPT | Performed by: PODIATRIST

## 2022-04-07 PROCEDURE — 99212 OFFICE O/P EST SF 10 MIN: CPT | Performed by: PODIATRIST

## 2022-04-07 RX ORDER — CLOTRIMAZOLE AND BETAMETHASONE DIPROPIONATE 10; .64 MG/G; MG/G
CREAM TOPICAL 2 TIMES DAILY
Qty: 30 G | Refills: 0 | Status: SHIPPED | OUTPATIENT
Start: 2022-04-07

## 2022-04-07 NOTE — PROGRESS NOTES
Patient presents with an aide  Chief complaint involves thickened elongated toenails that he cannot cut  He also has dry scaly skin left foot secondary to chronic tinea pedis  In the past, this responded well to Lotrisone cream   Pedal pulses are within normal limits  Treatment:  Trimmed all dystrophic toenails  Prescribed Lotrisone cream for b i d  Application  Patient is rescheduled in 3 months

## 2022-04-08 ENCOUNTER — OFFICE VISIT (OUTPATIENT)
Dept: FAMILY MEDICINE CLINIC | Facility: CLINIC | Age: 37
End: 2022-04-08

## 2022-04-08 ENCOUNTER — APPOINTMENT (OUTPATIENT)
Dept: LAB | Facility: CLINIC | Age: 37
End: 2022-04-08
Payer: COMMERCIAL

## 2022-04-08 VITALS
HEART RATE: 70 BPM | RESPIRATION RATE: 16 BRPM | DIASTOLIC BLOOD PRESSURE: 84 MMHG | SYSTOLIC BLOOD PRESSURE: 146 MMHG | WEIGHT: 208 LBS | OXYGEN SATURATION: 98 % | TEMPERATURE: 98.2 F | BODY MASS INDEX: 38.28 KG/M2 | HEIGHT: 62 IN

## 2022-04-08 DIAGNOSIS — Z11.1 SCREENING FOR TUBERCULOSIS: ICD-10-CM

## 2022-04-08 DIAGNOSIS — Q90.9 COMPLETE TRISOMY 21 SYNDROME: Primary | ICD-10-CM

## 2022-04-08 PROCEDURE — 36415 COLL VENOUS BLD VENIPUNCTURE: CPT

## 2022-04-08 PROCEDURE — 99213 OFFICE O/P EST LOW 20 MIN: CPT | Performed by: FAMILY MEDICINE

## 2022-04-08 PROCEDURE — 1036F TOBACCO NON-USER: CPT | Performed by: FAMILY MEDICINE

## 2022-04-08 PROCEDURE — 3008F BODY MASS INDEX DOCD: CPT | Performed by: FAMILY MEDICINE

## 2022-04-08 PROCEDURE — 3008F BODY MASS INDEX DOCD: CPT | Performed by: PODIATRIST

## 2022-04-08 PROCEDURE — 86480 TB TEST CELL IMMUN MEASURE: CPT

## 2022-04-08 NOTE — PROGRESS NOTES
Assessment/Plan:    Complete trisomy 21 syndrome  75-year-old with Down syndrome  Currently at baseline  Asymptomatic  No concern for worsening of disability  Ambulates independently  Quantiferon blood work ordered for TB screening as requested             Diagnoses and all orders for this visit:    Screening for tuberculosis  -     Quantiferon TB Gold Plus; Future        Subjective:      Patient ID: Viral Weaver is a 43 y o  male  75-year-old male with history of Down syndrome and intellectual disability presents today with a caregiver Baldo Gallagher for routine follow-up  Requesting routine TB screening, last screening was done in 2020 which is normal   No other acute concerns today  The following portions of the patient's history were reviewed and updated as appropriate: He  has a past medical history of Down's syndrome and Spinal pain  He   Patient Active Problem List    Diagnosis Date Noted    Chronic bilateral low back pain without sciatica 07/30/2021    Left foot pain 11/04/2020    Medicare annual wellness visit, subsequent 07/25/2020    Bilateral impacted cerumen 07/25/2020    Dry skin 10/29/2019    Calculus of gallbladder with chronic cholecystitis without obstruction 10/15/2019    Tinea unguium 10/02/2019    Ventral hernia without obstruction or gangrene 10/02/2019    Encounter for immunization 02/08/2019    Other hyperlipidemia 08/21/2018    Acute seasonal allergic rhinitis, unspecified trigger 12/12/2017    Intellectual functioning disability 09/21/2016    [facility] 09/21/2016    Complete trisomy 21 syndrome 03/10/2016    Class 2 obesity due to excess calories without serious comorbidity with body mass index (BMI) of 37 0 to 37 9 in adult 03/10/2016     He  has a past surgical history that includes No past surgeries and pr repair incisional hernia,reducible (N/A, 11/6/2019)  His family history includes No Known Problems in his father and mother    He reports that he has never smoked  He has never used smokeless tobacco  He reports previous alcohol use  He reports that he does not use drugs  Current Outpatient Medications   Medication Sig Dispense Refill    Cetirizine HCl (ZYRTEC ALLERGY) 10 MG CAPS Take 1 tablet by mouth      clotrimazole-betamethasone (LOTRISONE) 1-0 05 % cream Apply topically 2 (two) times a day 30 g 0    fluticasone (FLONASE) 50 mcg/act nasal spray 1 spray into each nostril daily      ibuprofen (MOTRIN) 600 mg tablet Take 1 tablet (600 mg total) by mouth every 6 (six) hours as needed for mild pain  30 tablet 0    white petrolatum ointment Apply topically 2 (two) times a day as needed for dry skin 106 g 2    carbamide peroxide (DEBROX) 6 5 % otic solution Administer 5 drops into both ears daily at bedtime 15 mL 0    Cholecalciferol (VITAMIN D3) 1 25 MG (48858 UT) CAPS Take by mouth (Patient not taking: Reported on 4/7/2022 )      clotrimazole-betamethasone (LOTRISONE) 1-0 05 % cream Apply topically 2 (two) times a day (Patient not taking: Reported on 4/7/2022 ) 30 g 0     No current facility-administered medications for this visit  Current Outpatient Medications on File Prior to Visit   Medication Sig    Cetirizine HCl (ZYRTEC ALLERGY) 10 MG CAPS Take 1 tablet by mouth    clotrimazole-betamethasone (LOTRISONE) 1-0 05 % cream Apply topically 2 (two) times a day    fluticasone (FLONASE) 50 mcg/act nasal spray 1 spray into each nostril daily    ibuprofen (MOTRIN) 600 mg tablet Take 1 tablet (600 mg total) by mouth every 6 (six) hours as needed for mild pain      white petrolatum ointment Apply topically 2 (two) times a day as needed for dry skin    carbamide peroxide (DEBROX) 6 5 % otic solution Administer 5 drops into both ears daily at bedtime    Cholecalciferol (VITAMIN D3) 1 25 MG (94153 UT) CAPS Take by mouth (Patient not taking: Reported on 4/7/2022 )    clotrimazole-betamethasone (LOTRISONE) 1-0 05 % cream Apply topically 2 (two) times a day (Patient not taking: Reported on 4/7/2022 )     No current facility-administered medications on file prior to visit  He has No Known Allergies       Review of Systems   Constitutional: Negative for chills and fever  HENT: Negative for congestion and sore throat  Respiratory: Negative for cough and shortness of breath  Cardiovascular: Negative for chest pain  Gastrointestinal: Negative for abdominal pain  Musculoskeletal: Negative for back pain and neck pain  Skin: Negative for rash  Neurological: Negative for dizziness, weakness and headaches  Psychiatric/Behavioral: Negative for agitation and behavioral problems  Objective:      /84 (BP Location: Left arm, Patient Position: Sitting, Cuff Size: Large)   Pulse 70   Temp 98 2 °F (36 8 °C) (Temporal)   Resp 16   Ht 5' 2" (1 575 m)   Wt 94 3 kg (208 lb)   SpO2 98%   BMI 38 04 kg/m²          Physical Exam  Vitals reviewed  Constitutional:       Appearance: He is well-developed  HENT:      Head: Normocephalic and atraumatic  Eyes:      Conjunctiva/sclera: Conjunctivae normal    Cardiovascular:      Rate and Rhythm: Normal rate and regular rhythm  Heart sounds: Normal heart sounds  No murmur heard  Pulmonary:      Effort: Pulmonary effort is normal  No respiratory distress  Breath sounds: Normal breath sounds  Abdominal:      General: Abdomen is flat  Bowel sounds are normal  There is no distension  Palpations: Abdomen is soft  Tenderness: There is no abdominal tenderness  Musculoskeletal:         General: Normal range of motion  Cervical back: Normal range of motion  Right lower leg: No edema  Left lower leg: No edema  Skin:     General: Skin is warm and dry  Neurological:      Mental Status: He is alert  Mental status is at baseline     Psychiatric:         Behavior: Behavior normal

## 2022-04-08 NOTE — ASSESSMENT & PLAN NOTE
25-year-old with Down syndrome    Currently at baseline  Asymptomatic  No concern for worsening of disability  Ambulates independently  Quantiferon blood work ordered for TB screening as requested

## 2022-04-11 LAB
GAMMA INTERFERON BACKGROUND BLD IA-ACNC: 0.03 IU/ML
M TB IFN-G BLD-IMP: NEGATIVE
M TB IFN-G CD4+ BCKGRND COR BLD-ACNC: -0.01 IU/ML
M TB IFN-G CD4+ BCKGRND COR BLD-ACNC: 0 IU/ML
MITOGEN IGNF BCKGRD COR BLD-ACNC: >10 IU/ML

## 2022-05-05 ENCOUNTER — CLINICAL SUPPORT (OUTPATIENT)
Dept: FAMILY MEDICINE CLINIC | Facility: CLINIC | Age: 37
End: 2022-05-05

## 2022-05-05 DIAGNOSIS — Z23 NEED FOR DIPHTHERIA-TETANUS-PERTUSSIS (TDAP) VACCINE: Primary | ICD-10-CM

## 2022-05-05 PROCEDURE — 90471 IMMUNIZATION ADMIN: CPT

## 2022-05-05 PROCEDURE — 90715 TDAP VACCINE 7 YRS/> IM: CPT

## 2022-07-07 ENCOUNTER — OFFICE VISIT (OUTPATIENT)
Dept: PODIATRY | Facility: CLINIC | Age: 37
End: 2022-07-07
Payer: COMMERCIAL

## 2022-07-07 VITALS
WEIGHT: 206.6 LBS | DIASTOLIC BLOOD PRESSURE: 75 MMHG | BODY MASS INDEX: 38.02 KG/M2 | HEIGHT: 62 IN | HEART RATE: 88 BPM | SYSTOLIC BLOOD PRESSURE: 109 MMHG

## 2022-07-07 DIAGNOSIS — L60.3 NAIL DYSTROPHY: Primary | ICD-10-CM

## 2022-07-07 PROCEDURE — 99212 OFFICE O/P EST SF 10 MIN: CPT | Performed by: PODIATRIST

## 2022-07-07 NOTE — PROGRESS NOTES
Patient presents for palliative toenail care  All toenails are elongated and dystrophic  Treatment consisted of trimming of multiple dystrophic toenails

## 2022-07-27 ENCOUNTER — RA CDI HCC (OUTPATIENT)
Dept: OTHER | Facility: HOSPITAL | Age: 37
End: 2022-07-27

## 2022-07-27 NOTE — PROGRESS NOTES
[facility]  coding opportunities       Chart reviewed, no opportunity found: CHART REVIEWED, NO OPPORTUNITY FOUND        Patients Insurance        Commercial Insurance: [address] [place] [address]

## 2022-08-10 ENCOUNTER — TELEPHONE (OUTPATIENT)
Dept: FAMILY MEDICINE CLINIC | Facility: CLINIC | Age: 37
End: 2022-08-10

## 2022-08-10 NOTE — TELEPHONE ENCOUNTER
Patient was told by doctor to take a covid test and clerical staff was in a meeting 12-1 and patient tried to call and Cancell

## 2022-08-11 ENCOUNTER — OFFICE VISIT (OUTPATIENT)
Dept: FAMILY MEDICINE CLINIC | Facility: CLINIC | Age: 37
End: 2022-08-11

## 2022-08-11 VITALS
OXYGEN SATURATION: 99 % | SYSTOLIC BLOOD PRESSURE: 115 MMHG | BODY MASS INDEX: 37.91 KG/M2 | HEART RATE: 75 BPM | TEMPERATURE: 98.9 F | HEIGHT: 62 IN | RESPIRATION RATE: 19 BRPM | DIASTOLIC BLOOD PRESSURE: 84 MMHG | WEIGHT: 206 LBS

## 2022-08-11 DIAGNOSIS — Z00.00 MEDICARE ANNUAL WELLNESS VISIT, SUBSEQUENT: Primary | ICD-10-CM

## 2022-08-11 DIAGNOSIS — E78.5 HYPERLIPIDEMIA, UNSPECIFIED HYPERLIPIDEMIA TYPE: ICD-10-CM

## 2022-08-11 DIAGNOSIS — H61.23 BILATERAL IMPACTED CERUMEN: ICD-10-CM

## 2022-08-11 PROBLEM — N18.2 CKD (CHRONIC KIDNEY DISEASE) STAGE 2, GFR 60-89 ML/MIN: Status: ACTIVE | Noted: 2022-08-11

## 2022-08-11 PROCEDURE — G0439 PPPS, SUBSEQ VISIT: HCPCS | Performed by: FAMILY MEDICINE

## 2022-08-11 PROCEDURE — 3725F SCREEN DEPRESSION PERFORMED: CPT | Performed by: FAMILY MEDICINE

## 2022-08-11 PROCEDURE — 69210 REMOVE IMPACTED EAR WAX UNI: CPT | Performed by: FAMILY MEDICINE

## 2022-08-11 NOTE — PROGRESS NOTES
Assessment and Plan:     Problem List Items Addressed This Visit        Nervous and Auditory    Bilateral impacted cerumen    Relevant Medications    carbamide peroxide (DEBROX) 6 5 % otic solution    Other Relevant Orders    Ear cerumen removal (Completed)       Other    Medicare annual wellness visit, subsequent - Primary      Other Visit Diagnoses     Hyperlipidemia, unspecified hyperlipidemia type        Relevant Orders    Lipid Panel with Direct LDL reflex    Comprehensive metabolic panel    CBC and Platelet    TSH, 3rd generation with Free T4 reflex        BMI Counseling: Body mass index is 37 68 kg/m²  The BMI is above normal  Nutrition recommendations include decreasing portion sizes, encouraging healthy choices of fruits and vegetables, decreasing fast food intake, consuming healthier snacks, limiting drinks that contain sugar, moderation in carbohydrate intake, increasing intake of lean protein, reducing intake of saturated and trans fat and reducing intake of cholesterol  Exercise recommendations include moderate physical activity 150 minutes/week, vigorous physical activity 75 minutes/week, exercising 3-5 times per week, obtaining a gym membership and strength training exercises  No pharmacotherapy was ordered  Rationale for BMI follow-up plan is due to patient being overweight or obese  Depression Screening and Follow-up Plan: Patient was screened for depression during today's encounter  They screened negative with a PHQ-2 score of 0  Preventive health issues were discussed with patient, and age appropriate screening tests were ordered as noted in patient's After Visit Summary  Personalized health advice and appropriate referrals for health education or preventive services given if needed, as noted in patient's After Visit Summary       History of Present Illness:     Patient presents for a Medicare Wellness Visit    HPI   Patient Care Team:  Isai Beyer MD as PCP - General (Family Medicine)  MD Jennifer Spear MD     Review of Systems:     Review of Systems   Constitutional: Negative for chills and fever  HENT: Negative for ear pain, sinus pain, sore throat and trouble swallowing  Eyes: Negative for pain and discharge  Respiratory: Negative for shortness of breath  Cardiovascular: Negative for chest pain, palpitations and leg swelling  Gastrointestinal: Negative for abdominal pain, nausea and vomiting  Endocrine: Negative for polydipsia and polyuria  Genitourinary: Negative for dysuria and hematuria  Musculoskeletal: Negative for arthralgias and myalgias  Skin: Negative for rash and wound  Neurological: Negative for seizures and syncope  Psychiatric/Behavioral: Negative for behavioral problems          Problem List:     Patient Active Problem List   Diagnosis    Complete trisomy 21 syndrome    Intellectual functioning disability    Class 2 obesity due to excess calories without serious comorbidity with body mass index (BMI) of 37 0 to 37 9 in adult    Other hyperlipidemia    Acute seasonal allergic rhinitis, unspecified trigger    Alzheimer's disease, early onset ([facility] [address] )    Encounter for immunization    Tinea unguium    Ventral hernia without obstruction or gangrene    Calculus of gallbladder with chronic cholecystitis without obstruction    Dry skin    Medicare annual wellness visit, subsequent    Bilateral impacted cerumen    Left foot pain    Chronic bilateral low back pain without sciatica    CKD (chronic kidney disease) stage 2, GFR 60-89 ml/min      Past Medical and Surgical History:     Past Medical History:   Diagnosis Date    Down's syndrome     Spinal pain      Past Surgical History:   Procedure Laterality Date    NO PAST SURGERIES      SD REPAIR INCISIONAL HERNIA,REDUCIBLE N/A 11/6/2019    Procedure: VENTRAL HERNIA REPAIR;  Surgeon: Cailin Morris MD;  Location: [facility];  Service: General      Family History:     Family History   Problem Relation Age of Onset    No Known Problems Mother     No Known Problems Father       Social History:     Social History     Socioeconomic History    Marital status: Single     Spouse name: None    Number of children: None    Years of education: None    Highest education level: None   Occupational History    None   Tobacco Use    Smoking status: Never Smoker    Smokeless tobacco: Never Used   Vaping Use    Vaping Use: Never used   Substance and Sexual Activity    Alcohol use: Not Currently     Comment: social    Drug use: Never    Sexual activity: Never   Other Topics Concern    None   Social History Narrative    None     Social Determinants of Health     Financial Resource Strain: Low Risk     Difficulty of Paying Living Expenses: Not hard at all   Food Insecurity: No Food Insecurity    Worried About Running Out of Food in the Last Year: Never true    Qing of Food in the Last Year: Never true   Transportation Needs: No Transportation Needs    Lack of Transportation (Medical): No    Lack of Transportation (Non-Medical):  No   Physical Activity: Inactive    Days of Exercise per Week: 0 days    Minutes of Exercise per Session: 0 min   Stress: No Stress Concern Present    Feeling of Stress : Not at all   Social Connections: Socially Isolated    Frequency of Communication with Friends and Family: Once a week    Frequency of Social Gatherings with Friends and Family: Once a week    Attends Ep[employer] Services: Never    Active Member of Clubs or Organizations: No    Attends Club or Organization Meetings: Never    Marital Status: Never    Intimate Partner Violence: Not At Risk    Fear of Current or Ex-Partner: No    Emotionally Abused: No    Physically Abused: No    Sexually Abused: No   Housing Stability: Low Risk     Unable to Pay for Housing in the Last Year: No    Number of [place] in the Last Year: 1    Unstable Housing in the Last Year: No Medications and Allergies:     Current Outpatient Medications   Medication Sig Dispense Refill    carbamide peroxide (DEBROX) 6 5 % otic solution Administer 5 drops into both ears daily at bedtime 15 mL 3    Cetirizine HCl 10 MG CAPS Take 1 tablet by mouth      Cholecalciferol (VITAMIN D3) 1 25 MG (36899 UT) CAPS Take by mouth      clotrimazole-betamethasone (LOTRISONE) 1-0 05 % cream Apply topically 2 (two) times a day 30 g 0    fluticasone (FLONASE) 50 mcg/act nasal spray 1 spray into each nostril daily      ibuprofen (MOTRIN) 600 mg tablet Take 1 tablet (600 mg total) by mouth every 6 (six) hours as needed for mild pain  30 tablet 0    white petrolatum ointment Apply topically 2 (two) times a day as needed for dry skin 106 g 2    clotrimazole-betamethasone (LOTRISONE) 1-0 05 % cream Apply topically 2 (two) times a day (Patient not taking: No sig reported) 30 g 0     No current facility-administered medications for this visit  No Known Allergies   Immunizations:     Immunization History   Administered Date(s) Administered    COVID-19 PFIZER VACCINE 0 3 ML IM 04/07/2021, 01/29/2022    Influenza Quadrivalent Preservative Free 3 years and older IM 12/12/2017    Influenza, injectable, quadrivalent, preservative free 0 5 mL 02/08/2019, 10/29/2019    Tdap 03/19/2012, 05/05/2022    Tuberculin Skin Test-PPD Intradermal 08/21/2018, 08/11/2020      Health Maintenance:         Topic Date Due    HIV Screening  09/11/2022 (Originally 10/8/1998)    Hepatitis C Screening  08/11/2023 (Originally 1985)         Topic Date Due    COVID-19 Vaccine (3 - Booster for P2P-Next series) 06/29/2022    Influenza Vaccine (1) 09/01/2022      Medicare Screening Tests and Risk Assessments:         Historian  Patient cannot answer questions due to cognitive impairment, intelluctual disability, or expressive limitations  Information provided by: caregiver  Health Risk Assessment:   Patient rates overall health as good  Patient feels that their physical health rating is same  Patient is very satisfied with their life  Eyesight was rated as same  Hearing was rated as same  Patient feels that their emotional and mental health rating is same  Patients states they are never, rarely angry  Patient states they are never, rarely unusually tired/fatigued  Pain experienced in the last 7 days has been none  Patient states that he has experienced no weight loss or gain in last 6 months  Depression Screening:   PHQ-2 Score: 0      Fall Risk Screening: In the past year, patient has experienced: no history of falling in past year      Home Safety:  Patient does not have trouble with stairs inside or outside of their home  Patient has working smoke alarms and has working carbon monoxide detector  Home safety hazards include: none  Nutrition:   Current diet is Regular  Medications:   Patient is currently taking over-the-counter supplements  OTC medications include: see medication list  Patient is not able to manage medications  Activities of Daily Living (ADLs)/Instrumental Activities of Daily Living (IADLs):   Walk and transfer into and out of bed and chair?: Yes  Dress and groom yourself?: Yes    Bathe or shower yourself?: Yes    Feed yourself?  Yes  Do your laundry/housekeeping?: No  Manage your money, pay your bills and track your expenses?: No  Make your own meals?: No    Do your own shopping?: No    Previous Hospitalizations:   Any hospitalizations or ED visits within the last 12 months?: No      Advance Care Planning:   Living will: No    Five wishes given: Yes      PREVENTIVE SCREENINGS      Cardiovascular Screening:    General: Screening Not Indicated and History Lipid Disorder      Diabetes Screening:     General: Screening Current      Prostate Cancer Screening:    General: Screening Not Indicated      Lung Cancer Screening:     General: Screening Not Indicated    Screening, Brief Intervention, and Referral to Treatment (SBIRT)    Screening  Typical number of drinks in a day: 0  Typical number of drinks in a week: 0  Interpretation: Low risk drinking behavior  AUDIT-C Screenin) How often did you have a drink containing alcohol in the past year? never  2) How many drinks did you have on a typical day when you were drinking in the past year? 0  3) How often did you have 6 or more drinks on one occasion in the past year? never    AUDIT-C Score: 0  Interpretation: Score 0-3 (male): Negative screen for alcohol misuse    No exam data present     Physical Exam:     /84 (BP Location: Left arm, Patient Position: Sitting, Cuff Size: Large)   Pulse 75   Temp 98 9 °F (37 2 °C) (Temporal)   Resp 19   Ht 5' 2" (1 575 m)   Wt 93 4 kg (206 lb)   SpO2 99%   BMI 37 68 kg/m²       Physical Exam  Vitals and nursing note reviewed  Exam conducted with a chaperone present  Constitutional:       General: He is not in acute distress  Appearance: Normal appearance  He is normal weight  He is not ill-appearing, toxic-appearing or diaphoretic  HENT:      Head: Normocephalic and atraumatic  Right Ear: There is impacted cerumen  Left Ear: There is impacted cerumen  Ears:      Comments: Skin tag of left ext auditory canal     Nose: Nose normal  No congestion  Mouth/Throat:      Mouth: Mucous membranes are moist       Pharynx: Oropharynx is clear  No oropharyngeal exudate or posterior oropharyngeal erythema  Eyes:      General: No scleral icterus  Right eye: No discharge  Left eye: No discharge  Extraocular Movements: Extraocular movements intact  Conjunctiva/sclera: Conjunctivae normal    Cardiovascular:      Rate and Rhythm: Normal rate and regular rhythm  Pulses: Normal pulses  Heart sounds: No murmur heard  Pulmonary:      Effort: Pulmonary effort is normal  No respiratory distress  Breath sounds: Normal breath sounds  No stridor  No wheezing, rhonchi or rales  Abdominal:      General: Abdomen is flat  Bowel sounds are normal  There is no distension  Tenderness: There is no abdominal tenderness  There is no guarding  Genitourinary:     Penis: Normal     Musculoskeletal:         General: No swelling or signs of injury  Normal range of motion  Cervical back: Normal range of motion  Right lower leg: No edema  Left lower leg: No edema  Skin:     General: Skin is warm  Capillary Refill: Capillary refill takes less than 2 seconds  Neurological:      General: No focal deficit present  Mental Status: He is alert and oriented to person, place, and time  Mental status is at baseline  Cranial Nerves: No cranial nerve deficit  Psychiatric:         Mood and Affect: Mood normal          Behavior: Behavior normal         Ear cerumen removal    Date/Time: 8/11/2022 4:39 PM  Performed by: Alba Carbajal MD  Authorized by: Alba Carbajal MD   Universal Protocol:  Procedure performed by:  Consent: Verbal consent obtained  Risks and benefits: risks, benefits and alternatives were discussed  Consent given by: patient  Patient understanding: patient states understanding of the procedure being performed  Patient identity confirmed: verbally with patient      Patient location:  Bedside  Procedure details:     Local anesthetic:  None    Location:  L ear and R ear    Procedure type: irrigation with instrumentation      Instrumentation: curette and forceps      Approach:  External  Post-procedure details:     Complication:  None    Hearing quality:  Improved    Patient tolerance of procedure:   Tolerated well, no immediate complications        Alba Carbajal MD

## 2022-08-11 NOTE — PATIENT INSTRUCTIONS
Medicare Preventive Visit Patient Instructions  Thank you for completing your Welcome to Medicare Visit or Medicare Annual Wellness Visit today  Your next wellness visit will be due in one year (8/12/2023)  The screening/preventive services that you may require over the next 5-10 years are detailed below  Some tests may not apply to you based off risk factors and/or age  Screening tests ordered at today's visit but not completed yet may show as past due  Also, please note that scanned in results may not display below  Preventive Screenings:  Service Recommendations Previous Testing/Comments   Colorectal Cancer Screening  · Colonoscopy    · Fecal Occult Blood Test (FOBT)/Fecal Immunochemical Test (FIT)  · Fecal DNA/Cologuard Test  · Flexible Sigmoidoscopy Age: 39-70 years old   Colonoscopy: every 10 years (May be performed more frequently if at higher risk)  OR  FOBT/FIT: every 1 year  OR  Cologuard: every 3 years  OR  Sigmoidoscopy: every 5 years  Screening may be recommended earlier than age 39 if at higher risk for colorectal cancer  Also, an individualized decision between you and your healthcare provider will decide whether screening between the ages of 74-80 would be appropriate   Colonoscopy: Not on file  FOBT/FIT: Not on file  Cologuard: Not on file  Sigmoidoscopy: Not on file          Prostate Cancer Screening Individualized decision between patient and health care provider in men between ages of 53-78   Medicare will cover every 12 months beginning on the day after your 50th birthday PSA: No results in last 5 years     Screening Not Indicated     Hepatitis C Screening Once for adults born between [facility]  More frequently in patients at high risk for Hepatitis C Hep C Antibody: Not on file        Diabetes Screening 1-2 times per year if you're at risk for diabetes or have pre-diabetes Fasting glucose: 85 mg/dL (9/4/2021)  A1C: 5 2 % (9/4/2021)  Screening Current   Cholesterol Screening Once every 5 years if you don't have a lipid disorder  May order more often based on risk factors  Lipid panel: 09/04/2021  Screening Not Indicated  History Lipid Disorder      Other Preventive Screenings Covered by Medicare:  1  Abdominal Aortic Aneurysm (AAA) Screening: covered once if your at risk  You're considered to be at risk if you have a family history of AAA or a male between the age of 73-68 who smoking at least 100 cigarettes in your lifetime  2  Lung Cancer Screening: covers low dose CT scan once per year if you meet all of the following conditions: (1) Age 50-69; (2) No signs or symptoms of lung cancer; (3) Current smoker or have quit smoking within the last 15 years; (4) You have a tobacco smoking history of at least 20 pack years (packs per day x number of years you smoked); (5) You get a written order from a healthcare provider  3  Glaucoma Screening: covered annually if you're considered high risk: (1) You have diabetes OR (2) Family history of glaucoma OR (3)  aged 48 and older OR (3)  American aged 72 and older  3  Osteoporosis Screening: covered every 2 years if you meet one of the following conditions: (1) Have a vertebral abnormality; (2) On glucocorticoid therapy for more than 3 months; (3) Have primary hyperparathyroidism; (4) On osteoporosis medications and need to assess response to drug therapy  5  HIV Screening: covered annually if you're between the age of 12-76  Also covered annually if you are younger than 13 and older than 72 with risk factors for HIV infection  For pregnant patients, it is covered up to 3 times per pregnancy      Immunizations:  Immunization Recommendations   Influenza Vaccine Annual influenza vaccination during flu season is recommended for all persons aged >= 6 months who do not have contraindications   Pneumococcal Vaccine   * Pneumococcal conjugate vaccine = PCV13 (Prevnar 13), PCV15 (Vaxneuvance), PCV20 (Prevnar 20)  * Pneumococcal polysaccharide vaccine = PPSV23 (Pneumovax) Adults 2364 years old: 1-3 doses may be recommended based on certain risk factors  Adults 72 years old: 1-2 doses may be recommended based off what pneumonia vaccine you previously received   Hepatitis B Vaccine 3 dose series if at intermediate or high risk (ex: diabetes, end stage renal disease, liver disease)   Tetanus (Td) Vaccine - COST NOT COVERED BY MEDICARE PART B Following completion of primary series, a booster dose should be given every 10 years to maintain immunity against tetanus  Td may also be given as tetanus wound prophylaxis  Tdap Vaccine - COST NOT COVERED BY MEDICARE PART B Recommended at least once for all adults  For pregnant patients, recommended with each pregnancy  Shingles Vaccine (Shingrix) - COST NOT COVERED BY MEDICARE PART B  2 shot series recommended in those aged 48 and above     Health Maintenance Due:      Topic Date Due    HIV Screening  09/11/2022 (Originally 10/8/1998)    Hepatitis C Screening  08/11/2023 (Originally 1985)     Immunizations Due:      Topic Date Due    COVID-19 Vaccine (3 - Booster for Pfizer series) 06/29/2022    Influenza Vaccine (1) 09/01/2022     Advance Directives   What are advance directives? Advance directives are legal documents that state your wishes and plans for medical care  These plans are made ahead of time in case you lose your ability to make decisions for yourself  Advance directives can apply to any medical decision, such as the treatments you want, and if you want to donate organs  What are the types of advance directives? There are many types of advance directives, and each state has rules about how to use them  You may choose a combination of any of the following:  · Living will: This is a written record of the treatment you want  You can also choose which treatments you do not want, which to limit, and which to stop at a certain time   This includes surgery, medicine, IV fluid, and tube feedings  · Durable power of  for healthcare [facility]): This is a written record that states who you want to make healthcare choices for you when you are unable to make them for yourself  This person, called a proxy, is usually a family member or a friend  You may choose more than 1 proxy  · Do not resuscitate (DNR) order:  A DNR order is used in case your heart stops beating or you stop breathing  It is a request not to have certain forms of treatment, such as CPR  A DNR order may be included in other types of advance directives  · Medical directive: This covers the care that you want if you are in a coma, near death, or unable to make decisions for yourself  You can list the treatments you want for each condition  Treatment may include pain medicine, surgery, blood transfusions, dialysis, IV or tube feedings, and a ventilator (breathing machine)  · Values history: This document has questions about your views, beliefs, and how you feel and think about life  This information can help others choose the care that you would choose  Why are advance directives important? An advance directive helps you control your care  Although spoken wishes may be used, it is better to have your wishes written down  Spoken wishes can be misunderstood, or not followed  Treatments may be given even if you do not want them  An advance directive may make it easier for your family to make difficult choices about your care  Weight Management   Why it is important to manage your weight:  Being overweight increases your risk of health conditions such as heart disease, high blood pressure, type 2 diabetes, and certain types of cancer  It can also increase your risk for osteoarthritis, sleep apnea, and other respiratory problems  Aim for a slow, steady weight loss  Even a small amount of weight loss can lower your risk of health problems    How to lose weight safely:  A safe and healthy way to lose weight is to eat fewer calories and get regular exercise  You can lose up about 1 pound a week by decreasing the number of calories you eat by 500 calories each day  Healthy meal plan for weight management:  A healthy meal plan includes a variety of foods, contains fewer calories, and helps you stay healthy  A healthy meal plan includes the following:  · Eat whole-grain foods more often  A healthy meal plan should contain fiber  Fiber is the part of grains, fruits, and vegetables that is not broken down by your body  Whole-grain foods are healthy and provide extra fiber in your diet  Some examples of whole-grain foods are whole-wheat breads and pastas, oatmeal, brown rice, and bulgur  · Eat a variety of vegetables every day  Include dark, leafy greens such as spinach, kale, lucie greens, and mustard greens  Eat yellow and orange vegetables such as carrots, sweet potatoes, and winter squash  · Eat a variety of fruits every day  Choose fresh or canned fruit (canned in its own juice or light syrup) instead of juice  Fruit juice has very little or no fiber  · Eat low-fat dairy foods  Drink fat-free (skim) milk or 1% milk  Eat fat-free yogurt and low-fat cottage cheese  Try low-fat cheeses such as mozzarella and other reduced-fat cheeses  · Choose meat and other protein foods that are low in fat  Choose beans or other legumes such as split peas or lentils  Choose fish, skinless poultry (chicken or turkey), or lean cuts of red meat (beef or pork)  Before you cook meat or poultry, cut off any visible fat  · Use less fat and oil  Try baking foods instead of frying them  Add less fat, such as margarine, sour cream, regular salad dressing and mayonnaise to foods  Eat fewer high-fat foods  Some examples of high-fat foods include french fries, doughnuts, ice cream, and cakes  · Eat fewer sweets  Limit foods and drinks that are high in sugar  This includes candy, cookies, regular soda, and sweetened drinks    Exercise:  Exercise at least 30 minutes per day on most days of the week  Some examples of exercise include walking, biking, dancing, and swimming  You can also fit in more physical activity by taking the stairs instead of the elevator or parking farther away from stores  Ask your healthcare provider about the best exercise plan for you  © Copyright [employer] 2018 Information is for End User's use only and may not be sold, redistributed or otherwise used for commercial purposes   All illustrations and images included in CareNotes® are the copyrighted property of [employer]  or [address] [place] [address]

## 2022-09-20 ENCOUNTER — TELEPHONE (OUTPATIENT)
Dept: NEUROLOGY | Facility: CLINIC | Age: 37
End: 2022-09-20

## 2022-09-20 NOTE — TELEPHONE ENCOUNTER
Called and spoke to patient - confirmed upcoming appointment with Nathaniel Henao on 09/27/22 3:45 pm at the [facility] office  Provided patient with apt date, time and location  Informed patient that check in is at least 15 minutes prior to apt time  The patient is not  having any issues or concerns at this time

## 2022-09-27 ENCOUNTER — OFFICE VISIT (OUTPATIENT)
Dept: NEUROLOGY | Facility: CLINIC | Age: 37
End: 2022-09-27
Payer: COMMERCIAL

## 2022-09-27 VITALS
SYSTOLIC BLOOD PRESSURE: 123 MMHG | BODY MASS INDEX: 40.97 KG/M2 | HEART RATE: 48 BPM | DIASTOLIC BLOOD PRESSURE: 91 MMHG | RESPIRATION RATE: 20 BRPM | TEMPERATURE: 98 F | WEIGHT: 217 LBS | HEIGHT: 61 IN

## 2022-09-27 DIAGNOSIS — F79 INTELLECTUAL FUNCTIONING DISABILITY: ICD-10-CM

## 2022-09-27 DIAGNOSIS — Q90.9 COMPLETE TRISOMY 21 SYNDROME: Primary | ICD-10-CM

## 2022-09-27 PROCEDURE — 99213 OFFICE O/P EST LOW 20 MIN: CPT | Performed by: NURSE PRACTITIONER

## 2022-09-27 NOTE — PROGRESS NOTES
Patient ID: Amber Douglass is a 43 y o  male  Assessment/Plan:  Exam is unchanged  We discussed importance of healthy eating/physical exercise/weight loss  He is instructed to keep regular follow up with primary care and return in 1 year or sooner if there are any changes  If changes occur in the future the MRI brain and labs for reversible causes of dementia would be repeated  Diagnoses and all orders for this visit:    Complete trisomy 21 syndrome    Intellectual functioning disability      Subjective:    HPI  Amber Douglass is a 43year old male with Downs syndrome, seasonal allergies, hyperlipidemia, cerumen impaction, dry skin, obesity who follows up on a yearly basis as requested by the state because of his family history of Alzheimers disease  He presents today with his caregiver, George Goltz who has been part of his care for a long time  Per his report and his caregiver report, nothing has changed  He is able to do all his typical ADL's  He does require some help in his day to day activities such as preparing food, managing finances, and getting to office visits/errands  This has not changed in some time and is related to his underlying intellectual disability  His Waisman ADL score is 28 today, unchanged from previous  His last MRI brain in 2017 showed white matter changes, no significant hippocampal volume loss to suggest neurodegenerative process  Denies trouble eating/drinking/sleeping  MRI brain Neuroquant 2017:  QUANTITATIVE:   Exam Quality: Adequate for volumetric analysis    Hippocampus  Total hippocampal volume (cc):   7 69    Percentile for similar age:      81th   Lateral ventricular volume (cc):     26 52    Percentile for similar age:     82th   Inferior lateral vent volume (cc):    1 75    Percentile for similar age:     52th   Quantitative conclusions:        Hippocampi:                          Normal volume        Lateral Ventricle Volume:     Normal Volume       Temporal Horn Volume: Normal Volume    He recently saw his primary care provider and  has upcoming labs to complete  He did have COVID in August 2022; he states recovered well from this  He mentions lately he has been enjoying watching sports- he likes to watch football/basketball and memorize scores and matchups  Caregivers try to keep him active but he has gained some weight; he does participate in activities; he states he likes to golf and has been doing this for a long time  The following portions of the patient's history were reviewed and updated as appropriate: allergies, current medications, past family history, past medical history, past social history, past surgical history and problem list          Objective:    Blood pressure 123/91, pulse (!) 48, temperature 98 °F (36 7 °C), temperature source Temporal, resp  rate 20, height 5' 1" (1 549 m), weight 98 4 kg (217 lb)  Physical Exam  Vitals reviewed  Constitutional:       General: He is not in acute distress  Appearance: He is obese  He is not ill-appearing, toxic-appearing or diaphoretic  HENT:      Head: Normocephalic and atraumatic  Right Ear: External ear normal       Left Ear: External ear normal       Mouth/Throat:      Mouth: Mucous membranes are moist       Pharynx: Oropharynx is clear  Eyes:      General: Lids are normal       Extraocular Movements: Extraocular movements intact  Pupils: Pupils are equal, round, and reactive to light  Cardiovascular:      Pulses: Normal pulses  Pulmonary:      Effort: Pulmonary effort is normal       Breath sounds: Normal breath sounds  Abdominal:      General: Abdomen is flat  Musculoskeletal:      Cervical back: Normal range of motion  No tenderness  Right lower leg: No edema  Left lower leg: No edema  Skin:     General: Skin is dry  Capillary Refill: Capillary refill takes less than 2 seconds  Findings: Erythema (face, dry skin) present     Neurological:      General: No focal deficit present  Mental Status: He is alert  Mental status is at baseline  Coordination: Romberg sign negative  Deep Tendon Reflexes: Strength normal and reflexes are normal and symmetric  Psychiatric:         Mood and Affect: Mood normal          Speech: Speech normal          Neurological Exam  Mental Status  Alert  Oriented to person, place and time  Speech is normal  Follows two-step commands  Unable to perform serial calculations  Able to spell words backwards  Caregiver states never able to perform serial 7's  Cranial Nerves  CN II: Visual acuity is normal  Visual fields full to confrontation  CN III, IV, VI: Extraocular movements intact bilaterally  Normal lids and orbits bilaterally  Pupils equal round and reactive to light bilaterally  CN V: Facial sensation is normal   CN VII: Full and symmetric facial movement  CN VIII: Hearing is normal   CN IX, X: Palate elevates symmetrically  Normal gag reflex  CN XI: Shoulder shrug strength is normal   CN XII: Tongue midline without atrophy or fasciculations  Motor  Normal muscle bulk throughout  Strength is 5/5 throughout all four extremities  Sensory  Light touch is normal in upper and lower extremities  Reflexes  Deep tendon reflexes are 2+ and symmetric in all four extremities  Coordination  Right: Finger-to-nose normal Left: Finger-to-nose normal     Gait  Casual gait is normal including stance, stride, and arm swing  Romberg is absent  Able to rise from chair without using arms  ROS:    Review of Systems   Constitutional: Negative  Negative for appetite change and fever  HENT: Negative  Negative for hearing loss, tinnitus, trouble swallowing and voice change  Eyes: Negative  Negative for photophobia and pain  Respiratory: Negative  Negative for shortness of breath  Cardiovascular: Negative  Negative for palpitations  Gastrointestinal: Negative  Negative for nausea and vomiting     Endocrine: Negative  Negative for cold intolerance  Genitourinary: Negative  Negative for dysuria, frequency and urgency  Musculoskeletal: Negative  Negative for myalgias and neck pain  Skin: Negative  Negative for rash  Neurological: Negative  Negative for dizziness, tremors, seizures, syncope, facial asymmetry, speech difficulty, weakness, light-headedness, numbness and headaches  Hematological: Negative  Does not bruise/bleed easily  Psychiatric/Behavioral: Negative  Negative for confusion, hallucinations and sleep disturbance     ROS was reviewed and updated as appropriate

## 2022-10-08 ENCOUNTER — APPOINTMENT (OUTPATIENT)
Dept: LAB | Facility: CLINIC | Age: 37
End: 2022-10-08
Payer: COMMERCIAL

## 2022-10-08 DIAGNOSIS — E78.5 HYPERLIPIDEMIA, UNSPECIFIED HYPERLIPIDEMIA TYPE: ICD-10-CM

## 2022-10-08 LAB
ALBUMIN SERPL BCP-MCNC: 3.5 G/DL (ref 3.5–5)
ALP SERPL-CCNC: 50 U/L (ref 34–104)
ALT SERPL W P-5'-P-CCNC: 23 U/L (ref 7–52)
ANION GAP SERPL CALCULATED.3IONS-SCNC: 8 MMOL/L (ref 4–13)
AST SERPL W P-5'-P-CCNC: 19 U/L (ref 13–39)
BILIRUB SERPL-MCNC: 0.76 MG/DL (ref 0.2–1)
BUN SERPL-MCNC: 17 MG/DL (ref 5–25)
CALCIUM SERPL-MCNC: 8.7 MG/DL (ref 8.4–10.2)
CHLORIDE SERPL-SCNC: 102 MMOL/L (ref 96–108)
CHOLEST SERPL-MCNC: 303 MG/DL
CO2 SERPL-SCNC: 29 MMOL/L (ref 21–32)
CREAT SERPL-MCNC: 1.37 MG/DL (ref 0.6–1.3)
ERYTHROCYTE [DISTWIDTH] IN BLOOD BY AUTOMATED COUNT: 14.9 % (ref 11.6–15.1)
GFR SERPL CREATININE-BSD FRML MDRD: 64 ML/MIN/1.73SQ M
GLUCOSE P FAST SERPL-MCNC: 100 MG/DL (ref 65–99)
HCT VFR BLD AUTO: 53.8 % (ref 36.5–49.3)
HDLC SERPL-MCNC: 46 MG/DL
HGB BLD-MCNC: 17.7 G/DL (ref 12–17)
LDLC SERPL CALC-MCNC: 202 MG/DL (ref 0–100)
MCH RBC QN AUTO: 31.6 PG (ref 26.8–34.3)
MCHC RBC AUTO-ENTMCNC: 32.9 G/DL (ref 31.4–37.4)
MCV RBC AUTO: 96 FL (ref 82–98)
PLATELET # BLD AUTO: 302 THOUSANDS/UL (ref 149–390)
PMV BLD AUTO: 10.3 FL (ref 8.9–12.7)
POTASSIUM SERPL-SCNC: 4 MMOL/L (ref 3.5–5.3)
PROT SERPL-MCNC: 6.8 G/DL (ref 6.4–8.4)
RBC # BLD AUTO: 5.6 MILLION/UL (ref 3.88–5.62)
SODIUM SERPL-SCNC: 139 MMOL/L (ref 135–147)
TRIGL SERPL-MCNC: 274 MG/DL
TSH SERPL DL<=0.05 MIU/L-ACNC: 2.65 UIU/ML (ref 0.45–4.5)
WBC # BLD AUTO: 5.96 THOUSAND/UL (ref 4.31–10.16)

## 2022-10-08 PROCEDURE — 80061 LIPID PANEL: CPT

## 2022-10-08 PROCEDURE — 80053 COMPREHEN METABOLIC PANEL: CPT

## 2022-10-08 PROCEDURE — 85027 COMPLETE CBC AUTOMATED: CPT

## 2022-10-08 PROCEDURE — 36415 COLL VENOUS BLD VENIPUNCTURE: CPT

## 2022-10-08 PROCEDURE — 84443 ASSAY THYROID STIM HORMONE: CPT

## 2022-10-12 PROBLEM — Z00.00 MEDICARE ANNUAL WELLNESS VISIT, SUBSEQUENT: Status: RESOLVED | Noted: 2020-07-25 | Resolved: 2022-10-12

## 2022-10-13 ENCOUNTER — OFFICE VISIT (OUTPATIENT)
Dept: PODIATRY | Facility: CLINIC | Age: 37
End: 2022-10-13
Payer: COMMERCIAL

## 2022-10-13 VITALS
DIASTOLIC BLOOD PRESSURE: 85 MMHG | WEIGHT: 217 LBS | HEART RATE: 103 BPM | BODY MASS INDEX: 40.97 KG/M2 | HEIGHT: 61 IN | SYSTOLIC BLOOD PRESSURE: 139 MMHG

## 2022-10-13 DIAGNOSIS — B35.3 TINEA PEDIS OF LEFT FOOT: ICD-10-CM

## 2022-10-13 DIAGNOSIS — L03.116 CELLULITIS OF LEFT FOOT: Primary | ICD-10-CM

## 2022-10-13 PROCEDURE — 99213 OFFICE O/P EST LOW 20 MIN: CPT | Performed by: PODIATRIST

## 2022-10-13 RX ORDER — CLOTRIMAZOLE AND BETAMETHASONE DIPROPIONATE 10; .64 MG/G; MG/G
CREAM TOPICAL 2 TIMES DAILY
Qty: 30 G | Refills: 0 | Status: SHIPPED | OUTPATIENT
Start: 2022-10-13

## 2022-10-13 RX ORDER — DOXYCYCLINE HYCLATE 100 MG/1
100 CAPSULE ORAL EVERY 12 HOURS SCHEDULED
Qty: 20 CAPSULE | Refills: 0 | Status: SHIPPED | OUTPATIENT
Start: 2022-10-13 | End: 2022-10-23

## 2022-10-13 NOTE — PROGRESS NOTES
Patient presents with an aide  It is noted that the left foot is red and swollen  Pain is relatively mild  This was 1st noticed today by the aide  Also, skin is dry and scaly on the sole of the left foot  All toenails are elongated  On exam, pedal pulses are palpable  All toenails elongated  A hallux valgus deformity is present left foot  Left foot is edematous and erythematous  There is no drainage present  Skin on the sole of the left foot is dry and scaly  Treatment:  Explained that symptoms of the left foot are consistent with cellulitis  Prescribed doxycycline 100 mg b i d  For 10 days  Also, prescribed Lotrisone cream for b i d  Application sole of left foot due to tinea pedis  Trimmed all elongated toenails  Patient is rescheduled in 2 weeks

## 2022-10-27 ENCOUNTER — OFFICE VISIT (OUTPATIENT)
Dept: PODIATRY | Facility: CLINIC | Age: 37
End: 2022-10-27
Payer: COMMERCIAL

## 2022-10-27 VITALS
BODY MASS INDEX: 40.97 KG/M2 | WEIGHT: 217 LBS | SYSTOLIC BLOOD PRESSURE: 150 MMHG | HEART RATE: 98 BPM | HEIGHT: 61 IN | DIASTOLIC BLOOD PRESSURE: 76 MMHG

## 2022-10-27 DIAGNOSIS — L03.116 CELLULITIS OF LEFT FOOT: Primary | ICD-10-CM

## 2022-10-27 PROCEDURE — 99212 OFFICE O/P EST SF 10 MIN: CPT | Performed by: PODIATRIST

## 2022-10-27 NOTE — PROGRESS NOTES
Patient presents for assessment of left foot  He is here with an aide  At last visit patient was treated for cellulitis of the left foot utilizing doxycycline 100 mg b i d   This cellulitis has resolved and patient is now comfortable  No additional need for antibiotics necessary  Patient will be rescheduled in 3 months for palliative care

## 2023-03-21 ENCOUNTER — OFFICE VISIT (OUTPATIENT)
Dept: FAMILY MEDICINE CLINIC | Facility: CLINIC | Age: 38
End: 2023-03-21

## 2023-03-21 ENCOUNTER — TELEPHONE (OUTPATIENT)
Dept: FAMILY MEDICINE CLINIC | Facility: CLINIC | Age: 38
End: 2023-03-21

## 2023-03-21 VITALS
OXYGEN SATURATION: 98 % | HEART RATE: 62 BPM | DIASTOLIC BLOOD PRESSURE: 76 MMHG | RESPIRATION RATE: 18 BRPM | TEMPERATURE: 97.9 F | SYSTOLIC BLOOD PRESSURE: 125 MMHG | WEIGHT: 213 LBS | BODY MASS INDEX: 40.22 KG/M2 | HEIGHT: 61 IN

## 2023-03-21 DIAGNOSIS — K43.9 VENTRAL HERNIA WITHOUT OBSTRUCTION OR GANGRENE: Primary | ICD-10-CM

## 2023-03-21 NOTE — TELEPHONE ENCOUNTER
Folder (To be completed by) - Wendy Arizmendi     Name of Form - Medical appointment form    Color folder (yellow    Form to be Faxed (Fax #), Mailed (Address), or Picked up (By whom) -    Patient was made aware of the 7-10 business day form policy

## 2023-03-21 NOTE — TELEPHONE ENCOUNTER
Venipuncture Blood Specimen Collection  Venipuncture performed in the left arm by Christie Ferguson MA with good hemostasis. Patient tolerated the procedure well without complications.   10/20/22   Christie Ferguson MA   Form completed and placed in YELLOW clerical folder for scanning

## 2023-03-21 NOTE — TELEPHONE ENCOUNTER
Folder (To be completed by) - Dr Candice Pino      Name of Form - athlete medical form physical exam     Color folder (yellow folder    Form to be Faxed (Fax #), Mailed (Address), or Picked up (By whom) -    Patient was made aware of the 7-10 business day form policy  Patient had a Carlos in august of 2022 and now is [facility]

## 2023-03-21 NOTE — ASSESSMENT & PLAN NOTE
Patient reports ventral hernia was noticed by some friends and was recommended to be evaluated  - Patient denies any symptoms and has not noticed any changes   - H/O ventral hernia s/p repair 2019    Abdominal exam: Abdomen soft, flat, nondistended  Nontender without guarding or rebound  Normal active bowel sound  (+) 5 x 5 cm reducible periumbilical hernia without sign of strangulation  Assessment: Ventral hernia vs diastasis recti, non-painful, easily reproducible and no red flag signs/symptoms      Plan:  - Continue observation  - Recommend avoiding constipation  - Patient to call if experiencing chronic constipation or abdominal pain  - Follow-up as needed

## 2023-03-21 NOTE — PROGRESS NOTES
Name: Loren Fong      : 1985      MRN: [id number]  Encounter Provider: Jl Lares MD  Encounter Date: 3/21/2023   Encounter department: [address] [place] [address]  Ventral hernia without obstruction or gangrene  Assessment & Plan:  Patient reports ventral hernia was noticed by some friends and was recommended to be evaluated  - Patient denies any symptoms and has not noticed any changes   - H/O ventral hernia s/p repair     Abdominal exam: Abdomen soft, flat, nondistended  Nontender without guarding or rebound  Normal active bowel sound  (+) 5 x 5 cm reducible periumbilical hernia without sign of strangulation  Assessment: Ventral hernia vs diastasis recti, non-painful, easily reproducible and no red flag signs/symptoms  Plan:  - Continue observation  - Recommend avoiding constipation  - Patient to call if experiencing chronic constipation or abdominal pain  - Follow-up as needed           Follow-up: Return in about 1 week (around 3/28/2023) for cerumen removal     Subjective     Chief Complaint   Patient presents with   • Bulge on umbilicus area     HPI   38-year-old male presents with his caretaker for evaluation of ventral hernia  Patient reports his hernia was noticed by some friends and was recommended to be evaluated  Otherwise patient denies any symptoms and has not noticed any changes  No other questions or concerns this time  Review of Systems   Constitutional: Negative for chills and fever  HENT: Negative for trouble swallowing  Eyes: Negative for pain and redness  Respiratory: Negative for shortness of breath  Cardiovascular: Negative for chest pain  Gastrointestinal: Negative for abdominal pain  Endocrine: Negative for cold intolerance and heat intolerance  Genitourinary: Negative for dysuria and hematuria  Musculoskeletal: Negative for arthralgias and myalgias  Skin: Negative for wound  Neurological: Negative for seizures and syncope  Psychiatric/Behavioral: Negative for behavioral problems  Current Outpatient Medications on File Prior to Visit   Medication Sig   • carbamide peroxide (DEBROX) 6 5 % otic solution Administer 5 drops into both ears daily at bedtime   • Cetirizine HCl 10 MG CAPS Take 1 tablet by mouth   • clotrimazole-betamethasone (LOTRISONE) 1-0 05 % cream Apply topically 2 (two) times a day   • clotrimazole-betamethasone (LOTRISONE) 1-0 05 % cream Apply topically 2 (two) times a day   • clotrimazole-betamethasone (LOTRISONE) 1-0 05 % cream Apply topically 2 (two) times a day   • fluticasone (FLONASE) 50 mcg/act nasal spray 1 spray into each nostril daily   • ibuprofen (MOTRIN) 600 mg tablet Take 1 tablet (600 mg total) by mouth every 6 (six) hours as needed for mild pain  • white petrolatum ointment Apply topically 2 (two) times a day as needed for dry skin   • Cholecalciferol (VITAMIN D3) 1 25 MG (98225 UT) CAPS Take by mouth (Patient not taking: Reported on 9/27/2022)       Objective     /76 (BP Location: Left arm, Patient Position: Sitting, Cuff Size: Standard)   Pulse 62   Temp 97 9 °F (36 6 °C) (Temporal)   Resp 18   Ht 5' 1" (1 549 m)   Wt 96 6 kg (213 lb)   SpO2 98%   BMI 40 25 kg/m²     Physical Exam  Vitals and nursing note reviewed  Constitutional:       General: He is not in acute distress  Appearance: Normal appearance  He is normal weight  He is not ill-appearing, toxic-appearing or diaphoretic  HENT:      Head: Normocephalic and atraumatic  Right Ear: External ear normal       Left Ear: External ear normal       Nose: Nose normal       Mouth/Throat:      Pharynx: Oropharynx is clear  Eyes:      Extraocular Movements: Extraocular movements intact  Conjunctiva/sclera: Conjunctivae normal    Cardiovascular:      Rate and Rhythm: Normal rate     Pulmonary:      Effort: Pulmonary effort is normal    Abdominal:      General: Abdomen is flat  Bowel sounds are normal  There is no distension  Palpations: Abdomen is soft  There is no mass  Tenderness: There is no abdominal tenderness  There is no guarding or rebound  Hernia: A hernia is present  Comments: Abdominal exam: Abdomen soft, flat, nondistended  Nontender without guarding or rebound  Normal active bowel sound  (+) 5 x 5 cm reducible periumbilical hernia without sign of strangulation  Musculoskeletal:         General: Normal range of motion  Cervical back: Normal range of motion  Skin:     General: Skin is warm  Coloration: Skin is not jaundiced or pale  Neurological:      General: No focal deficit present  Mental Status: He is alert  Mental status is at baseline     Psychiatric:         Mood and Affect: Mood normal          Behavior: Behavior normal        Geovani Burns MD

## 2023-03-30 ENCOUNTER — OFFICE VISIT (OUTPATIENT)
Dept: FAMILY MEDICINE CLINIC | Facility: CLINIC | Age: 38
End: 2023-03-30

## 2023-03-30 VITALS
RESPIRATION RATE: 18 BRPM | DIASTOLIC BLOOD PRESSURE: 86 MMHG | SYSTOLIC BLOOD PRESSURE: 124 MMHG | HEART RATE: 82 BPM | OXYGEN SATURATION: 98 %

## 2023-03-30 DIAGNOSIS — H61.23 BILATERAL IMPACTED CERUMEN: Primary | ICD-10-CM

## 2023-03-30 NOTE — PROGRESS NOTES
Ear cerumen removal    Date/Time: 3/30/2023 4:21 PM  Performed by: Marya Navarro MD  Authorized by: Marya Navarro MD   Universal Protocol:  Procedure performed by: Tessa Boogie MA)  Consent: Verbal consent obtained  Risks and benefits: risks, benefits and alternatives were discussed  Consent given by: patient and guardian  Patient understanding: patient states understanding of the procedure being performed  Required items: required blood products, implants, devices, and special equipment available  Patient identity confirmed: verbally with patient      Patient location:  Bedside  Procedure details:     Local anesthetic:  None    Location:  L ear and R ear    Procedure type: irrigation with instrumentation      Instrumentation: curette and forceps      Approach:  External and natural orifice  Post-procedure details:     Complication:  None    Hearing quality:  Improved    Patient tolerance of procedure:   Tolerated well, no immediate complications      Follow-up: Return in about 1 month (around 4/30/2023) for Cerumen removal

## 2023-04-27 ENCOUNTER — OFFICE VISIT (OUTPATIENT)
Dept: PODIATRY | Facility: CLINIC | Age: 38
End: 2023-04-27

## 2023-04-27 VITALS — BODY MASS INDEX: 39.84 KG/M2 | HEIGHT: 61 IN | WEIGHT: 211 LBS

## 2023-04-27 DIAGNOSIS — L60.3 NAIL DYSTROPHY: Primary | ICD-10-CM

## 2023-04-27 NOTE — PROGRESS NOTES
Patient presents for palliative nail care  No acute disorder noted  All toenails are extremely long  Treatment consists of nail trimming  He is rescheduled in 3 months  Patient is here with an aide

## 2023-05-04 ENCOUNTER — PROCEDURE VISIT (OUTPATIENT)
Dept: FAMILY MEDICINE CLINIC | Facility: CLINIC | Age: 38
End: 2023-05-04

## 2023-05-04 VITALS
DIASTOLIC BLOOD PRESSURE: 83 MMHG | HEART RATE: 61 BPM | TEMPERATURE: 98.1 F | RESPIRATION RATE: 20 BRPM | OXYGEN SATURATION: 98 % | BODY MASS INDEX: 40.02 KG/M2 | WEIGHT: 211.8 LBS | SYSTOLIC BLOOD PRESSURE: 143 MMHG

## 2023-05-04 DIAGNOSIS — H61.23 BILATERAL IMPACTED CERUMEN: Primary | ICD-10-CM

## 2023-05-04 NOTE — PROGRESS NOTES
Ear cerumen removal    Date/Time: 5/4/2023 4:33 PM  Performed by: Marilu Alberts MD  Authorized by: Marilu Alberts MD   Universal Protocol:  Procedure performed by: Missy Reyes MA)  Consent: Verbal consent obtained  Risks and benefits: risks, benefits and alternatives were discussed  Consent given by: patient  Patient understanding: patient states understanding of the procedure being performed  Patient identity confirmed: verbally with patient      Patient location:  Bedside  Procedure details:     Local anesthetic:  None    Location:  L ear and R ear    Procedure type: irrigation with instrumentation      Instrumentation: curette and forceps      Approach:  External  Post-procedure details:     Complication:  None    Hearing quality:  Improved    Patient tolerance of procedure:   Tolerated well, no immediate complications

## 2023-06-09 ENCOUNTER — TELEPHONE (OUTPATIENT)
Dept: PODIATRY | Facility: CLINIC | Age: 38
End: 2023-06-09

## 2023-06-09 ENCOUNTER — OFFICE VISIT (OUTPATIENT)
Dept: URGENT CARE | Age: 38
End: 2023-06-09
Payer: COMMERCIAL

## 2023-06-09 VITALS
RESPIRATION RATE: 20 BRPM | OXYGEN SATURATION: 99 % | SYSTOLIC BLOOD PRESSURE: 135 MMHG | DIASTOLIC BLOOD PRESSURE: 81 MMHG | HEART RATE: 82 BPM | TEMPERATURE: 98.7 F

## 2023-06-09 DIAGNOSIS — M79.671 ACUTE PAIN OF RIGHT FOOT: Primary | ICD-10-CM

## 2023-06-09 PROCEDURE — S9083 URGENT CARE CENTER GLOBAL: HCPCS

## 2023-06-09 PROCEDURE — G0382 LEV 3 HOSP TYPE B ED VISIT: HCPCS

## 2023-06-09 NOTE — TELEPHONE ENCOUNTER
Mrs Catherine Brown called to see what antbx Dr Deedee Meraz gave her  last time - they are going to [facility] for cellulitis

## 2023-06-09 NOTE — PROGRESS NOTES
[address]        NAME: Disha Houser is a 42 y o  male  : 1985    MRN: [id number]  DATE: 2023  TIME: 7:01 PM    Assessment and Plan   Acute pain of right foot [M79 [id number]]  1  Acute pain of right foot          Discussed with patient's caregiver to trial ice application and ibuprofen  Discussed that there is concern for gout based on symptomatology  Discussed that further evaluation is warranted by PCP if symptoms persist   Patient Instructions       Apply ice to affected area  Profen as needed for pain  Follow-up with PCP if symptoms persist   Proceed to  ER if symptoms worsen  Chief Complaint     Chief Complaint   Patient presents with   • Foot Pain     Patients caretaker states that the patient is having redness, swelling, and pain in the right foot  She notes history of infection in the right foot a year or two ago  Patient states it is painful to walk  No drainage         History of Present Illness       Patient presenting for evaluation of acute right foot pain  Patient states that over the past day he has been having pain, redness and swelling near the MTP of the right great toe  He denies any trauma or injury to the area  He denies any current treatment for symptoms  Patient's caregiver states that he has had a similar occurrence in the past and was treated for cellulitis  Review of Systems   Review of Systems   Constitutional: Negative for chills and fever  Respiratory: Negative for shortness of breath  Cardiovascular: Negative for chest pain  Musculoskeletal: Positive for arthralgias and joint swelling  Skin: Positive for color change  All other systems reviewed and are negative          Current Medications       Current Outpatient Medications:   •  carbamide peroxide (DEBROX) 6 5 % otic solution, Administer 5 drops into both ears daily at bedtime, Disp: 15 mL, Rfl: 3  •  Cetirizine HCl 10 MG CAPS, Take 1 tablet by mouth, Disp: , Rfl:   • clotrimazole-betamethasone (LOTRISONE) 1-0 05 % cream, Apply topically 2 (two) times a day, Disp: 30 g, Rfl: 0  •  clotrimazole-betamethasone (LOTRISONE) 1-0 05 % cream, Apply topically 2 (two) times a day, Disp: 30 g, Rfl: 0  •  clotrimazole-betamethasone (LOTRISONE) 1-0 05 % cream, Apply topically 2 (two) times a day, Disp: 30 g, Rfl: 0  •  fluticasone (FLONASE) 50 mcg/act nasal spray, 1 spray into each nostril daily, Disp: , Rfl:   •  ibuprofen (MOTRIN) 600 mg tablet, Take 1 tablet (600 mg total) by mouth every 6 (six) hours as needed for mild pain , Disp: 30 tablet, Rfl: 0  •  white petrolatum ointment, Apply topically 2 (two) times a day as needed for dry skin, Disp: 106 g, Rfl: 2  •  Cholecalciferol (VITAMIN D3) 1 25 MG (18588 UT) CAPS, Take by mouth (Patient not taking: Reported on 9/27/2022), Disp: , Rfl:     Current Allergies     Allergies as of 06/09/2023   • (No Known Allergies)            The following portions of the patient's history were reviewed and updated as appropriate: allergies, current medications, past family history, past medical history, past social history, past surgical history and problem list      Past Medical History:   Diagnosis Date   • Down's syndrome    • Spinal pain        Past Surgical History:   Procedure Laterality Date   • NO PAST SURGERIES     • NH REPAIR FIRST ABDOMINAL WALL HERNIA N/A 11/6/2019    Procedure: VENTRAL HERNIA REPAIR;  Surgeon: Savana Sheikh MD;  Location: [facility];  Service: General       Family History   Problem Relation Age of Onset   • No Known Problems Mother    • No Known Problems Father          Medications have been verified  Objective   /81   Pulse 82   Temp 98 7 °F (37 1 °C)   Resp 20   SpO2 99%        Physical Exam     Physical Exam  Vitals and nursing note reviewed  Constitutional:       General: He is not in acute distress  Appearance: Normal appearance  He is normal weight   He is not ill-appearing, toxic-appearing or diaphoretic  HENT:      Head: Normocephalic and atraumatic  Eyes:      General:         Right eye: No discharge  Left eye: No discharge  Cardiovascular:      Rate and Rhythm: Normal rate  Pulses: Normal pulses  Pulmonary:      Effort: Pulmonary effort is normal    Musculoskeletal:         General: Swelling and tenderness present  Feet:    Skin:     General: Skin is warm and dry  Capillary Refill: Capillary refill takes less than 2 seconds  Findings: Erythema present  Neurological:      Mental Status: He is alert     Psychiatric:         Mood and Affect: Mood normal

## 2023-07-27 ENCOUNTER — OFFICE VISIT (OUTPATIENT)
Dept: PODIATRY | Facility: CLINIC | Age: 38
End: 2023-07-27
Payer: COMMERCIAL

## 2023-07-27 VITALS
HEART RATE: 49 BPM | BODY MASS INDEX: 39.84 KG/M2 | DIASTOLIC BLOOD PRESSURE: 81 MMHG | HEIGHT: 61 IN | WEIGHT: 211 LBS | SYSTOLIC BLOOD PRESSURE: 114 MMHG | RESPIRATION RATE: 18 BRPM

## 2023-07-27 DIAGNOSIS — L60.3 NAIL DYSTROPHY: Primary | ICD-10-CM

## 2023-07-27 PROCEDURE — 99212 OFFICE O/P EST SF 10 MIN: CPT | Performed by: PODIATRIST

## 2023-07-27 NOTE — PROGRESS NOTES
Patient presents for palliative toenail care. Patient is here with an aide. No acute disorder noted. Pedal pulses are palpable. Treatment consisted of trimming of elongated dystrophic nails. He is rescheduled in 3 months.

## 2023-08-15 ENCOUNTER — RA CDI HCC (OUTPATIENT)
Dept: OTHER | Facility: HOSPITAL | Age: 38
End: 2023-08-15

## 2023-08-15 NOTE — PROGRESS NOTES
[address] [facility] coding opportunities       Chart reviewed, no opportunity found: CHART REVIEWED, NO OPPORTUNITY FOUND        Patients Insurance     Medicare Insurance: [employer]

## 2023-08-25 ENCOUNTER — OFFICE VISIT (OUTPATIENT)
Dept: FAMILY MEDICINE CLINIC | Facility: CLINIC | Age: 38
End: 2023-08-25

## 2023-08-25 VITALS
HEART RATE: 66 BPM | SYSTOLIC BLOOD PRESSURE: 144 MMHG | WEIGHT: 208.8 LBS | TEMPERATURE: 98.2 F | HEIGHT: 61 IN | RESPIRATION RATE: 18 BRPM | DIASTOLIC BLOOD PRESSURE: 78 MMHG | OXYGEN SATURATION: 99 % | BODY MASS INDEX: 39.42 KG/M2

## 2023-08-25 DIAGNOSIS — Z11.4 SCREENING FOR HIV (HUMAN IMMUNODEFICIENCY VIRUS): ICD-10-CM

## 2023-08-25 DIAGNOSIS — Z00.00 MEDICARE ANNUAL WELLNESS VISIT, SUBSEQUENT: Primary | ICD-10-CM

## 2023-08-25 DIAGNOSIS — Z13.220 SCREENING FOR LIPID DISORDERS: ICD-10-CM

## 2023-08-25 DIAGNOSIS — B35.3 TINEA PEDIS OF LEFT FOOT: ICD-10-CM

## 2023-08-25 DIAGNOSIS — E55.9 VITAMIN D DEFICIENCY: ICD-10-CM

## 2023-08-25 DIAGNOSIS — Z13.29 SCREENING FOR HYPOTHYROIDISM: ICD-10-CM

## 2023-08-25 DIAGNOSIS — Z13.6 SCREENING FOR CARDIOVASCULAR CONDITION: ICD-10-CM

## 2023-08-25 DIAGNOSIS — Z11.59 ENCOUNTER FOR HEPATITIS C SCREENING TEST FOR LOW RISK PATIENT: ICD-10-CM

## 2023-08-25 DIAGNOSIS — Z13.0 SCREENING FOR DEFICIENCY ANEMIA: ICD-10-CM

## 2023-08-25 RX ORDER — CLOTRIMAZOLE AND BETAMETHASONE DIPROPIONATE 10; .64 MG/G; MG/G
CREAM TOPICAL 2 TIMES DAILY
Qty: 30 G | Refills: 0 | Status: SHIPPED | OUTPATIENT
Start: 2023-08-25

## 2023-08-25 NOTE — PROGRESS NOTES
Assessment and Plan:     Problem List Items Addressed This Visit    None  Visit Diagnoses     Medicare annual wellness visit, subsequent    -  Primary    Tinea pedis of left foot        Relevant Medications    clotrimazole-betamethasone (LOTRISONE) 1-0.05 % cream    Screening for hypothyroidism        Relevant Orders    TSH, 3rd generation with Free T4 reflex    Vitamin D deficiency        Relevant Orders    Vitamin D 25 hydroxy    Screening for lipid disorders        Relevant Orders    Lipid panel    Screening for HIV (human immunodeficiency virus)        Relevant Orders    HIV 1/2 AG/AB w Reflex [facility] for 2 yr old and above    Encounter for hepatitis C screening test for low risk patient        Relevant Orders    Hepatitis C antibody    Screening for deficiency anemia        Relevant Orders    CBC and Platelet    Screening for cardiovascular condition        Relevant Orders    Comprehensive metabolic panel    HEMOGLOBIN A1C W/ EAG ESTIMATION        Plan:  Labs were ordered for yearly which included CBC, CMP, lipid panel, TSH, vitamin D level, hemoglobin A1c. Preventive health issues were discussed with patient, and age appropriate screening tests were ordered as noted in patient's After Visit Summary. Personalized health advice and appropriate referrals for health education or preventive services given if needed, as noted in patient's After Visit Summary. BMI Counseling: Body mass index is 39.97 kg/m². The BMI is above normal. Nutrition recommendations include reducing portion sizes, decreasing overall calorie intake, 3-5 servings of fruits/vegetables daily, reducing fast food intake, consuming healthier snacks, decreasing soda and/or juice intake, moderation in carbohydrate intake, increasing intake of lean protein, reducing intake of saturated fat and trans fat and reducing intake of cholesterol. Exercise recommendations include moderate aerobic physical activity for 150 minutes/week.      History of Present Illness:     Patient presents for a Medicare Wellness Visit    HPI   40-year-old male presents today for Medicare annual wellness visit. He denies any questions or concerns. He states that he has been doing well, trying to lose weight with diet and exercise changes. No changes to his medications. No recent illnesses. Patient Care Team:  Lennie Martinez DO as PCP - General (Family Medicine)  MD Arnoldo Henderson MD     Review of Systems:     Review of Systems   Constitutional: Negative for chills and fever. HENT: Negative for sore throat. Respiratory: Negative for cough and shortness of breath. Cardiovascular: Negative for chest pain and palpitations. Gastrointestinal: Negative for abdominal pain, blood in stool, constipation, diarrhea, nausea and vomiting. Genitourinary: Negative for dysuria and hematuria. Musculoskeletal: Negative for arthralgias and back pain. Skin: Negative for color change and rash. Neurological: Negative for syncope and headaches. Psychiatric/Behavioral: Negative for agitation and behavioral problems. All other systems reviewed and are negative.        Problem List:     Patient Active Problem List   Diagnosis   • Complete trisomy 21 syndrome   • Intellectual functioning disability   • Class 2 obesity due to excess calories without serious comorbidity with body mass index (BMI) of 37.0 to 37.9 in adult   • Other hyperlipidemia   • Acute seasonal allergic rhinitis, unspecified trigger   • Alzheimer's disease, early onset ([address])   • Encounter for immunization   • Tinea unguium   • Ventral hernia without obstruction or gangrene   • Calculus of gallbladder with chronic cholecystitis without obstruction   • Dry skin   • Bilateral impacted cerumen   • Left foot pain   • Chronic bilateral low back pain without sciatica   • CKD (chronic kidney disease) stage 2, GFR 60-89 ml/min      Past Medical and Surgical History:     Past Medical History: Diagnosis Date   • Down's syndrome    • Spinal pain      Past Surgical History:   Procedure Laterality Date   • NO PAST SURGERIES     • LA REPAIR FIRST ABDOMINAL WALL HERNIA N/A 11/6/2019    Procedure: VENTRAL HERNIA REPAIR;  Surgeon: Gay Hassan MD;  Location: [facility];  Service: General      Family History:     Family History   Problem Relation Age of Onset   • No Known Problems Mother    • No Known Problems Father       Social History:     Social History     Socioeconomic History   • Marital status: Single     Spouse name: None   • Number of children: None   • Years of education: None   • Highest education level: None   Occupational History   • None   Tobacco Use   • Smoking status: Never   • Smokeless tobacco: Never   Vaping Use   • Vaping Use: Never used   Substance and Sexual Activity   • Alcohol use: Not Currently     Comment: social   • Drug use: Never   • Sexual activity: Never   Other Topics Concern   • None   Social History Narrative   • None     Social Determinants of Health     Financial Resource Strain: Low Risk  (8/25/2023)    Overall Financial Resource Strain (CARDIA)    • Difficulty of Paying Living Expenses: Not hard at all   Food Insecurity: No Food Insecurity (8/25/2023)    Hunger Vital Sign    • Worried About Running Out of Food in the Last Year: Never true    • Ran Out of Food in the Last Year: Never true   Transportation Needs: No Transportation Needs (8/25/2023)    PRAPARE - Transportation    • Lack of Transportation (Medical): No    • Lack of Transportation (Non-Medical): No   Physical Activity: Insufficiently Active (8/25/2023)    Exercise Vital Sign    • Days of Exercise per Week: 7 days    • Minutes of Exercise per Session: 20 min   Stress: No Stress Concern Present (8/25/2023)    [address] [place] [address]    • Feeling of Stress : Not at all   Social Connections:  Moderately Integrated (8/25/2023)    Social Connection and Isolation Panel [NHANES]    • Frequency of Communication with Friends and Family: Three times a week    • Frequency of Social Gatherings with Friends and Family: Three times a week    • Attends [employer] Services: More than 4 times per year    • Active Member of Clubs or Organizations: Yes    • Attends Club or Organization Meetings: 1 to 4 times per year    • Marital Status: Never    Intimate Partner Violence: Not At Risk (8/25/2023)    Humiliation, Afraid, Rape, and Kick questionnaire    • Fear of Current or Ex-Partner: No    • Emotionally Abused: No    • Physically Abused: No    • Sexually Abused: No   Housing Stability: Unknown (8/25/2023)    Housing Stability Vital Sign    • Unable to Pay for Housing in the Last Year: No    • Number of Places Lived in the Last Year: Not on file    • Unstable Housing in the Last Year: No      Medications and Allergies:     Current Outpatient Medications   Medication Sig Dispense Refill   • carbamide peroxide (DEBROX) 6.5 % otic solution Administer 5 drops into both ears daily at bedtime 15 mL 3   • Cetirizine HCl 10 MG CAPS Take 1 tablet by mouth     • clotrimazole-betamethasone (LOTRISONE) 1-0.05 % cream Apply topically 2 (two) times a day 30 g 0   • fluticasone (FLONASE) 50 mcg/act nasal spray 1 spray into each nostril daily     • ibuprofen (MOTRIN) 600 mg tablet Take 1 tablet (600 mg total) by mouth every 6 (six) hours as needed for mild pain. 30 tablet 0   • white petrolatum ointment Apply topically 2 (two) times a day as needed for dry skin 106 g 2     No current facility-administered medications for this visit.      No Known Allergies   Immunizations:     Immunization History   Administered Date(s) Administered   • COVID-19 PFIZER VACCINE 0.3 ML IM 04/07/2021, 01/29/2022   • Influenza Quadrivalent Preservative Free 3 years and older IM 12/12/2017   • Influenza, injectable, quadrivalent, preservative free 0.5 mL 02/08/2019, 10/29/2019   • Tdap 03/19/2012, 05/05/2022   • Tuberculin Skin Test-PPD Intradermal 08/21/2018, 08/11/2020      Health Maintenance:         Topic Date Due   • Hepatitis C Screening  Never done   • HIV Screening  Never done         Topic Date Due   • COVID-19 Vaccine (3 - Pfizer series) 03/26/2022   • Influenza Vaccine (1) 09/01/2023      Medicare Screening Tests and Risk Assessments:     Edy Monk is here for his Subsequent Wellness visit. Health Risk Assessment:   Patient rates overall health as excellent. Patient feels that their physical health rating is same. Patient is very satisfied with their life. Eyesight was rated as same. Hearing was rated as same. Patient feels that their emotional and mental health rating is much better. Patients states they are never, rarely angry. Patient states they are sometimes unusually tired/fatigued. Pain experienced in the last 7 days has been none. Patient states that he has experienced no weight loss or gain in last 6 months. Depression Screening:   PHQ-2 Score: 0      Fall Risk Screening: In the past year, patient has experienced: no history of falling in past year      Home Safety:  Patient does not have trouble with stairs inside or outside of their home. Patient has working smoke alarms and has working carbon monoxide detector. Home safety hazards include: none. Nutrition:   Current diet is Regular. Medications:   Patient is currently taking over-the-counter supplements. OTC medications include: see medication list. Patient is able to manage medications. Activities of Daily Living (ADLs)/Instrumental Activities of Daily Living (IADLs):   Walk and transfer into and out of bed and chair?: Yes  Dress and groom yourself?: Yes    Bathe or shower yourself?: Yes    Feed yourself?  Yes  Do your laundry/housekeeping?: Yes  Manage your money, pay your bills and track your expenses?: Yes  Make your own meals?: Yes    Do your own shopping?: Yes    Previous Hospitalizations:   Any hospitalizations or ED visits within the last 12 months?: No      Advance Care Planning:   Living will: No    Durable POA for healthcare: No    Advanced directive: No    Five wishes given: Yes      Cognitive Screening:   Provider or family/friend/caregiver concerned regarding cognition?: No    PREVENTIVE SCREENINGS      Cardiovascular Screening:    General: Screening Not Indicated and History Lipid Disorder      Diabetes Screening:     General: Screening Current      Colorectal Cancer Screening:     General: Screening Not Indicated      Prostate Cancer Screening:    General: Screening Not Indicated      Osteoporosis Screening:    General: Screening Not Indicated      Abdominal Aortic Aneurysm (AAA) Screening:        General: Screening Not Indicated      Lung Cancer Screening:     General: Screening Not Indicated      Hepatitis C Screening:      Hep C Screening Accepted: Yes      Screening, Brief Intervention, and Referral to Treatment (SBIRT)    Screening      AUDIT-C Screenin) How often did you have a drink containing alcohol in the past year? never  2) How many drinks did you have on a typical day when you were drinking in the past year? 0  3) How often did you have 6 or more drinks on one occasion in the past year? never    AUDIT-C Score: 0  Interpretation: Score 0-3 (male): Negative screen for alcohol misuse    Other Counseling Topics:   Car/seat belt/driving safety, sunscreen and calcium and vitamin D intake and regular weightbearing exercise. No results found. Physical Exam:     /78 (BP Location: Right arm, Patient Position: Sitting, Cuff Size: Large)   Pulse 66   Temp 98.2 °F (36.8 °C) (Temporal)   Resp 18   Ht 5' 0.6" (1.539 m)   Wt 94.7 kg (208 lb 12.8 oz)   SpO2 99%   BMI 39.97 kg/m²     Physical Exam  Vitals and nursing note reviewed. Constitutional:       General: He is not in acute distress. Appearance: Normal appearance. He is well-developed. He is not ill-appearing.    HENT:      Head: Normocephalic and atraumatic. Right Ear: Tympanic membrane, ear canal and external ear normal. There is no impacted cerumen. Left Ear: Tympanic membrane, ear canal and external ear normal. There is no impacted cerumen. Nose: Nose normal.      Mouth/Throat:      Mouth: Mucous membranes are moist.   Eyes:      Conjunctiva/sclera: Conjunctivae normal.      Pupils: Pupils are equal, round, and reactive to light. Cardiovascular:      Rate and Rhythm: Normal rate and regular rhythm. Heart sounds: No murmur heard. No friction rub. No gallop. Pulmonary:      Effort: Pulmonary effort is normal. No respiratory distress. Breath sounds: Normal breath sounds. No wheezing, rhonchi or rales. Abdominal:      General: Bowel sounds are normal. There is no distension. Palpations: Abdomen is soft. Tenderness: There is no abdominal tenderness. Musculoskeletal:         General: No swelling. Cervical back: Neck supple. Right lower leg: No edema. Left lower leg: No edema. Skin:     General: Skin is warm and dry. Capillary Refill: Capillary refill takes less than 2 seconds. Neurological:      General: No focal deficit present. Mental Status: He is alert and oriented to person, place, and time.    Psychiatric:         Mood and Affect: Mood normal.         Behavior: Behavior normal.          Dano Snow DO

## 2023-08-25 NOTE — PATIENT INSTRUCTIONS
Medicare Preventive Visit Patient Instructions  Thank you for completing your Welcome to Medicare Visit or Medicare Annual Wellness Visit today. Your next wellness visit will be due in one year (8/25/2024). The screening/preventive services that you may require over the next 5-10 years are detailed below. Some tests may not apply to you based off risk factors and/or age. Screening tests ordered at today's visit but not completed yet may show as past due. Also, please note that scanned in results may not display below. Preventive Screenings:  Service Recommendations Previous Testing/Comments   Colorectal Cancer Screening  · Colonoscopy    · Fecal Occult Blood Test (FOBT)/Fecal Immunochemical Test (FIT)  · Fecal DNA/Cologuard Test  · Flexible Sigmoidoscopy Age: 43-73 years old   Colonoscopy: every 10 years (May be performed more frequently if at higher risk)  OR  FOBT/FIT: every 1 year  OR  Cologuard: every 3 years  OR  Sigmoidoscopy: every 5 years  Screening may be recommended earlier than age 39 if at higher risk for colorectal cancer. Also, an individualized decision between you and your healthcare provider will decide whether screening between the ages of 77-80 would be appropriate.  Colonoscopy: Not on file  FOBT/FIT: Not on file  Cologuard: Not on file  Sigmoidoscopy: Not on file          Prostate Cancer Screening Individualized decision between patient and health care provider in men between ages of 53-66   Medicare will cover every 12 months beginning on the day after your 50th birthday PSA: No results in last 5 years     Screening Not Indicated     Hepatitis C Screening Once for adults born between 1945 and 1965  More frequently in patients at high risk for Hepatitis C Hep C Antibody: Not on file        Diabetes Screening 1-2 times per year if you're at risk for diabetes or have pre-diabetes Fasting glucose: 100 mg/dL (10/8/2022)  A1C: 5.2 % (9/4/2021)  Screening Current   Cholesterol Screening Once every 5 years if you don't have a lipid disorder. May order more often based on risk factors. Lipid panel: 10/08/2022  Screening Not Indicated  History Lipid Disorder      Other Preventive Screenings Covered by Medicare:  1. Abdominal Aortic Aneurysm (AAA) Screening: covered once if your at risk. You're considered to be at risk if you have a family history of AAA or a male between the age of 70-76 who smoking at least 100 cigarettes in your lifetime. 2. Lung Cancer Screening: covers low dose CT scan once per year if you meet all of the following conditions: (1) Age 48-67; (2) No signs or symptoms of lung cancer; (3) Current smoker or have quit smoking within the last 15 years; (4) You have a tobacco smoking history of at least 20 pack years (packs per day x number of years you smoked); (5) You get a written order from a healthcare provider. 3. Glaucoma Screening: covered annually if you're considered high risk: (1) You have diabetes OR (2) Family history of glaucoma OR (3)  aged 48 and older OR (3)  American aged 72 and older  3. Osteoporosis Screening: covered every 2 years if you meet one of the following conditions: (1) Have a vertebral abnormality; (2) On glucocorticoid therapy for more than 3 months; (3) Have primary hyperparathyroidism; (4) On osteoporosis medications and need to assess response to drug therapy. 5. HIV Screening: covered annually if you're between the age of 14-79. Also covered annually if you are younger than 13 and older than 72 with risk factors for HIV infection. For pregnant patients, it is covered up to 3 times per pregnancy.     Immunizations:  Immunization Recommendations   Influenza Vaccine Annual influenza vaccination during flu season is recommended for all persons aged >= 6 months who do not have contraindications   Pneumococcal Vaccine   * Pneumococcal conjugate vaccine = PCV13 (Prevnar 13), PCV15 (Vaxneuvance), PCV20 (Prevnar 20)  * Pneumococcal polysaccharide vaccine = PPSV23 (Pneumovax) Adults 2364 years old: 1-3 doses may be recommended based on certain risk factors  Adults 72 years old: 1-2 doses may be recommended based off what pneumonia vaccine you previously received   Hepatitis B Vaccine 3 dose series if at intermediate or high risk (ex: diabetes, end stage renal disease, liver disease)   Tetanus (Td) Vaccine - COST NOT COVERED BY MEDICARE PART B Following completion of primary series, a booster dose should be given every 10 years to maintain immunity against tetanus. Td may also be given as tetanus wound prophylaxis. Tdap Vaccine - COST NOT COVERED BY MEDICARE PART B Recommended at least once for all adults. For pregnant patients, recommended with each pregnancy. Shingles Vaccine (Shingrix) - COST NOT COVERED BY MEDICARE PART B  2 shot series recommended in those aged 48 and above     Health Maintenance Due:      Topic Date Due   • Hepatitis C Screening  Never done   • HIV Screening  Never done     Immunizations Due:      Topic Date Due   • COVID-19 Vaccine (3 - Pfizer series) 03/26/2022   • Influenza Vaccine (1) 09/01/2023     Advance Directives   What are advance directives? Advance directives are legal documents that state your wishes and plans for medical care. These plans are made ahead of time in case you lose your ability to make decisions for yourself. Advance directives can apply to any medical decision, such as the treatments you want, and if you want to donate organs. What are the types of advance directives? There are many types of advance directives, and each state has rules about how to use them. You may choose a combination of any of the following:  · Living will: This is a written record of the treatment you want. You can also choose which treatments you do not want, which to limit, and which to stop at a certain time. This includes surgery, medicine, IV fluid, and tube feedings.    · Durable power of  for healthcare [facility]): This is a written record that states who you want to make healthcare choices for you when you are unable to make them for yourself. This person, called a proxy, is usually a family member or a friend. You may choose more than 1 proxy. · Do not resuscitate (DNR) order:  A DNR order is used in case your heart stops beating or you stop breathing. It is a request not to have certain forms of treatment, such as CPR. A DNR order may be included in other types of advance directives. · Medical directive: This covers the care that you want if you are in a coma, near death, or unable to make decisions for yourself. You can list the treatments you want for each condition. Treatment may include pain medicine, surgery, blood transfusions, dialysis, IV or tube feedings, and a ventilator (breathing machine). · Values history: This document has questions about your views, beliefs, and how you feel and think about life. This information can help others choose the care that you would choose. Why are advance directives important? An advance directive helps you control your care. Although spoken wishes may be used, it is better to have your wishes written down. Spoken wishes can be misunderstood, or not followed. Treatments may be given even if you do not want them. An advance directive may make it easier for your family to make difficult choices about your care. Weight Management   Why it is important to manage your weight:  Being overweight increases your risk of health conditions such as heart disease, high blood pressure, type 2 diabetes, and certain types of cancer. It can also increase your risk for osteoarthritis, sleep apnea, and other respiratory problems. Aim for a slow, steady weight loss. Even a small amount of weight loss can lower your risk of health problems. How to lose weight safely:  A safe and healthy way to lose weight is to eat fewer calories and get regular exercise.  You can lose up about 1 pound a week by decreasing the number of calories you eat by 500 calories each day. Healthy meal plan for weight management:  A healthy meal plan includes a variety of foods, contains fewer calories, and helps you stay healthy. A healthy meal plan includes the following:  · Eat whole-grain foods more often. A healthy meal plan should contain fiber. Fiber is the part of grains, fruits, and vegetables that is not broken down by your body. Whole-grain foods are healthy and provide extra fiber in your diet. Some examples of whole-grain foods are whole-wheat breads and pastas, oatmeal, brown rice, and bulgur. · Eat a variety of vegetables every day. Include dark, leafy greens such as spinach, kale, lucie greens, and mustard greens. Eat yellow and orange vegetables such as carrots, sweet potatoes, and winter squash. · Eat a variety of fruits every day. Choose fresh or canned fruit (canned in its own juice or light syrup) instead of juice. Fruit juice has very little or no fiber. · Eat low-fat dairy foods. Drink fat-free (skim) milk or 1% milk. Eat fat-free yogurt and low-fat cottage cheese. Try low-fat cheeses such as mozzarella and other reduced-fat cheeses. · Choose meat and other protein foods that are low in fat. Choose beans or other legumes such as split peas or lentils. Choose fish, skinless poultry (chicken or turkey), or lean cuts of red meat (beef or pork). Before you cook meat or poultry, cut off any visible fat. · Use less fat and oil. Try baking foods instead of frying them. Add less fat, such as margarine, sour cream, regular salad dressing and mayonnaise to foods. Eat fewer high-fat foods. Some examples of high-fat foods include french fries, doughnuts, ice cream, and cakes. · Eat fewer sweets. Limit foods and drinks that are high in sugar. This includes candy, cookies, regular soda, and sweetened drinks. Exercise:  Exercise at least 30 minutes per day on most days of the week.  Some examples of exercise include walking, biking, dancing, and swimming. You can also fit in more physical activity by taking the stairs instead of the elevator or parking farther away from stores. Ask your healthcare provider about the best exercise plan for you. © Copyright [employer] 2018 Information is for End User's use only and may not be sold, redistributed or otherwise used for commercial purposes.  All illustrations and images included in CareNotes® are the copyrighted property of [employer].D.A.M., Inc. or  [address]

## 2023-08-28 ENCOUNTER — TELEPHONE (OUTPATIENT)
Dept: FAMILY MEDICINE CLINIC | Facility: CLINIC | Age: 38
End: 2023-08-28

## 2023-08-28 NOTE — TELEPHONE ENCOUNTER
Forms from state    Physical and Doctor Report and Nutritional and Self Medication Form    Luke Barnes is caregiver that had brought in paperwork on 8/25 appointment.     There are no forms on Georgianne Portillo can be reached at  [phone]

## 2023-08-30 ENCOUNTER — TELEPHONE (OUTPATIENT)
Dept: FAMILY MEDICINE CLINIC | Facility: CLINIC | Age: 38
End: 2023-08-30

## 2023-08-30 NOTE — TELEPHONE ENCOUNTER
Reviewed patient's chart, he is up to date on vaccines, will be due for flu shot in fall. He is scheduled this Fri 9/1/23 for PPD placement, but this will have to be changed as we will not be here on Monday for reading. Please change appointment.

## 2023-08-30 NOTE — TELEPHONE ENCOUNTER
Pt was here 08/25/2023    There was a vaccine that wasn't avail at that time the care giver would like to know if we received the vaccine     Dr Brenda suazo

## 2023-09-14 ENCOUNTER — TELEPHONE (OUTPATIENT)
Dept: NEUROLOGY | Facility: CLINIC | Age: 38
End: 2023-09-14

## 2023-09-14 NOTE — TELEPHONE ENCOUNTER
Called and spoke to patient's caregiver - confirmed upcoming appointment with Cirilo Sebastian on 09/20/23 8:45 am at the [facility]. Provided patient with apt date, time and location. Informed patient that check in is at least 15 minutes prior to apt time. The patient is not  having any issues or concerns at this time.

## 2023-09-20 ENCOUNTER — OFFICE VISIT (OUTPATIENT)
Dept: NEUROLOGY | Facility: CLINIC | Age: 38
End: 2023-09-20
Payer: COMMERCIAL

## 2023-09-20 VITALS
HEIGHT: 61 IN | RESPIRATION RATE: 16 BRPM | SYSTOLIC BLOOD PRESSURE: 129 MMHG | HEART RATE: 52 BPM | TEMPERATURE: 97.5 F | DIASTOLIC BLOOD PRESSURE: 66 MMHG | OXYGEN SATURATION: 98 % | WEIGHT: 213 LBS | BODY MASS INDEX: 40.22 KG/M2

## 2023-09-20 DIAGNOSIS — E53.8 LOW SERUM VITAMIN B12: ICD-10-CM

## 2023-09-20 DIAGNOSIS — Q90.9 COMPLETE TRISOMY 21 SYNDROME: Primary | ICD-10-CM

## 2023-09-20 DIAGNOSIS — F79 INTELLECTUAL FUNCTIONING DISABILITY: ICD-10-CM

## 2023-09-20 DIAGNOSIS — Z82.0 FAMILY HISTORY OF ALZHEIMER'S DISEASE: ICD-10-CM

## 2023-09-20 PROCEDURE — 99213 OFFICE O/P EST LOW 20 MIN: CPT | Performed by: NURSE PRACTITIONER

## 2023-09-20 NOTE — PATIENT INSTRUCTIONS
Continue with plans for repeat labs-repeat b12/folate levels with upcoming labs  Continue with plans to see the primary care office to monitor/discuss high cholesterol  Continue with healthy living/exercise and weight loss  Let us know if any changes.   Follow up in 1 year

## 2023-09-20 NOTE — PROGRESS NOTES
Patient ID: King Dial is a 42 y.o. male. Assessment/Plan:  Patient Instructions:  Continue with plans for repeat labs-repeat b12/folate levels with upcoming labs  Continue with plans to see the primary care office to monitor/discuss high cholesterol  Continue with healthy living/exercise and weight loss  Let us know if any changes. Follow up in 1 year       Diagnoses and all orders for this visit:    Complete trisomy 21 syndrome  -     Vitamin B12; Future  -     Folate; Future    Intellectual functioning disability  -     Vitamin B12; Future  -     Folate; Future    Low serum vitamin B12  -     Vitamin B12; Future  -     Folate; Future    Family history of Alzheimer's disease         Subjective:    MARIA Dial is a 43year old male with Downs syndrome, seasonal allergies, hyperlipidemia, cerumen impaction, dry skin, obesity who follows up on a yearly basis as requested by the state because of his family history of Alzheimers disease. He presents today with his caregiver. He denies any significant change since his last visit. His waisman adl score is 25, slightly down from previous. His MMSE today is 19/30, similar to previous. He has labs upcoming; labs from last year reviewed. No recent hospitalizations.     Lab Results   Component Value Date    WBC 5.96 10/08/2022    HGB 17.7 (H) 10/08/2022    HCT 53.8 (H) 10/08/2022    MCV 96 10/08/2022     10/08/2022     Lab Results   Component Value Date     06/16/2017    SODIUM 139 10/08/2022    K 4.0 10/08/2022     10/08/2022    CO2 29 10/08/2022    AGAP 8 10/08/2022    BUN 17 10/08/2022    CREATININE 1.37 (H) 10/08/2022    GLUC 88 10/31/2019    GLUF 100 (H) 10/08/2022    CALCIUM 8.7 10/08/2022    AST 19 10/08/2022    ALT 23 10/08/2022    ALKPHOS 50 10/08/2022    PROT 6.3 06/16/2017    TP 6.8 10/08/2022    BILITOT 0.4 06/16/2017    TBILI 0.76 10/08/2022    EGFR 64 10/08/2022     Lab Results   Component Value Date    LDLCALC 202 (H) 10/08/2022 Lab Results   Component Value Date    XAR6JOCZDLHC 2.650 10/08/2022    TSH 2.810 08/25/2018     Lab Results   Component Value Date    CGOKCUPR56 748 08/02/2017     Lab Results   Component Value Date    FOLATE 20.1 08/02/2017     MRI brain Neuroquant 2017:  QUANTITATIVE:   Exam Quality: Adequate for volumetric analysis. Hippocampus  Total hippocampal volume (cc):   7.69    Percentile for similar age:      77MS   Lateral ventricular volume (cc):     59.13    Percentile for similar age:     80th   Inferior lateral vent volume (cc):    1.75    Percentile for similar age:     48th   Quantitative conclusions:        Hippocampi:                          Normal volume        Lateral Ventricle Volume:     Normal Volume       Temporal Horn Volume:       Normal Volume    The following portions of the patient's history were reviewed and updated as appropriate: allergies, current medications, past family history, past medical history, past social history, past surgical history and problem list.         Objective:    Blood pressure 129/66, pulse (!) 52, temperature 97.5 °F (36.4 °C), temperature source Temporal, resp. rate 16, height 5' 1" (1.549 m), weight 96.6 kg (213 lb), SpO2 98 %. Physical Exam  Vitals reviewed. Constitutional:       General: He is not in acute distress. Appearance: He is obese. HENT:      Head: Normocephalic and atraumatic. Right Ear: External ear normal.      Left Ear: External ear normal.      Nose: Nose normal.      Mouth/Throat:      Mouth: Mucous membranes are moist.      Pharynx: Oropharynx is clear. Eyes:      General: Lids are normal.         Right eye: No discharge. Left eye: No discharge. Extraocular Movements: Extraocular movements intact. Pupils: Pupils are equal, round, and reactive to light. Cardiovascular:      Rate and Rhythm: Normal rate and regular rhythm. Pulses: Normal pulses. Heart sounds: Normal heart sounds.    Pulmonary: Effort: Pulmonary effort is normal.      Breath sounds: Normal breath sounds. Abdominal:      General: There is no distension. Musculoskeletal:      Cervical back: Normal range of motion. Right lower leg: No edema. Left lower leg: No edema. Skin:     General: Skin is warm and dry. Capillary Refill: Capillary refill takes less than 2 seconds. Neurological:      General: No focal deficit present. Mental Status: He is alert. Mental status is at baseline. Motor: Motor strength is normal.     Coordination: Romberg sign negative. Deep Tendon Reflexes:      Reflex Scores:       Brachioradialis reflexes are 2+ on the right side and 2+ on the left side. Patellar reflexes are 2+ on the right side and 2+ on the left side. Psychiatric:         Mood and Affect: Mood normal.         Speech: Speech normal.         Behavior: Behavior normal.         Neurological Exam  Mental Status  Alert. Speech is normal. Language is fluent with no aphasia. Cranial Nerves  CN II: Visual acuity is normal. Visual fields full to confrontation. CN III, IV, VI: Extraocular movements intact bilaterally. Normal lids and orbits bilaterally. Pupils equal round and reactive to light bilaterally. CN V: Facial sensation is normal.  CN VII: Full and symmetric facial movement. CN VIII: Hearing is normal.  CN IX, X: Palate elevates symmetrically. Normal gag reflex. CN XI: Shoulder shrug strength is normal.  CN XII: Tongue midline without atrophy or fasciculations. Motor  Normal muscle bulk throughout. Strength is 5/5 throughout all four extremities. Sensory  Light touch is normal in upper and lower extremities.      Reflexes                                            Right                      Left  Brachioradialis                    2+                         2+  Patellar                                2+                         2+    Coordination  Right: Finger-to-nose normal.Left: Finger-to-nose normal.    Gait  Casual gait is normal including stance, stride, and arm swing. Romberg is absent. Able to rise from chair without using arms. ROS:    Review of Systems   Constitutional: Negative for appetite change, fatigue and fever. HENT: Negative. Negative for hearing loss, tinnitus, trouble swallowing and voice change. Eyes: Negative. Negative for photophobia, pain and visual disturbance. Respiratory: Negative. Negative for shortness of breath. Cardiovascular: Negative. Negative for palpitations. Gastrointestinal: Negative. Negative for nausea and vomiting. Endocrine: Negative. Negative for cold intolerance. Genitourinary: Negative. Negative for dysuria, frequency and urgency. Musculoskeletal: Negative for back pain, gait problem, myalgias and neck pain. Skin: Negative. Negative for rash. Allergic/Immunologic: Negative. Neurological: Negative. Negative for dizziness, tremors, seizures, syncope, facial asymmetry, speech difficulty, weakness, light-headedness, numbness and headaches. Hematological: Negative. Does not bruise/bleed easily. Psychiatric/Behavioral: Negative. Negative for confusion, hallucinations and sleep disturbance.    ROS was reviewed and updated as appropriate

## 2023-10-26 ENCOUNTER — OFFICE VISIT (OUTPATIENT)
Dept: PODIATRY | Facility: CLINIC | Age: 38
End: 2023-10-26
Payer: COMMERCIAL

## 2023-10-26 VITALS
HEIGHT: 60 IN | BODY MASS INDEX: 41.6 KG/M2 | DIASTOLIC BLOOD PRESSURE: 86 MMHG | SYSTOLIC BLOOD PRESSURE: 122 MMHG | RESPIRATION RATE: 18 BRPM | HEART RATE: 97 BPM

## 2023-10-26 DIAGNOSIS — B35.1 ONYCHOMYCOSIS: ICD-10-CM

## 2023-10-26 DIAGNOSIS — L60.3 NAIL DYSTROPHY: Primary | ICD-10-CM

## 2023-10-26 PROCEDURE — 99212 OFFICE O/P EST SF 10 MIN: CPT | Performed by: PODIATRIST

## 2023-10-26 NOTE — PROGRESS NOTES
Patient presents for palliative toenail care. All left foot toenails are thick and yellow secondary to onychomycosis. Remainder of toenails are elongated on the right foot. Treatment consisted of nail trimming. Reappoint 3 months.

## 2024-01-09 ENCOUNTER — HOSPITAL ENCOUNTER (OUTPATIENT)
Dept: RADIOLOGY | Facility: HOSPITAL | Age: 39
Discharge: HOME | End: 2024-01-09
Payer: COMMERCIAL

## 2024-01-09 ENCOUNTER — OFFICE VISIT (OUTPATIENT)
Dept: FAMILY MEDICINE CLINIC | Facility: CLINIC | Age: 39
End: 2024-01-09

## 2024-01-09 VITALS
TEMPERATURE: 98.7 F | HEIGHT: 61 IN | BODY MASS INDEX: 40.02 KG/M2 | OXYGEN SATURATION: 98 % | HEART RATE: 89 BPM | SYSTOLIC BLOOD PRESSURE: 126 MMHG | WEIGHT: 212 LBS | DIASTOLIC BLOOD PRESSURE: 82 MMHG

## 2024-01-09 DIAGNOSIS — M79.672 LEFT FOOT PAIN: ICD-10-CM

## 2024-01-09 DIAGNOSIS — M79.672 LEFT FOOT PAIN: Primary | ICD-10-CM

## 2024-01-09 PROCEDURE — 99213 OFFICE O/P EST LOW 20 MIN: CPT | Performed by: FAMILY MEDICINE

## 2024-01-09 PROCEDURE — 73630 X-RAY EXAM OF FOOT: CPT

## 2024-01-09 NOTE — ASSESSMENT & PLAN NOTE
Patient with left foot tenderness on dorsal surface proximal to L. Hallux. Without redness or erythema, no systemic symptoms, denies trauma but poor historian. Follows regularly with podiatry. With left foot onychomycosis, with history of cellulitis of foot. Suspect trauma at this time given overlying bruising, lack of erythema, fever, or systemic symptoms. Will order XR of L Foot, will follow up with results. If XR is benign, will treat as cellulitis with 10d Bactrim. Recommend maintaining follow up with podiatry on the 25th or earlier if desired.

## 2024-01-09 NOTE — PROGRESS NOTES
Name: Nitesh Frankel      : 1985      MRN: [id number]  Encounter Provider: Ash Viramontes MD  Encounter Date: 2024   Encounter department: [facility]    Assessment & Plan     1. Left foot pain  Assessment & Plan:  Patient with left foot tenderness on dorsal surface proximal to L. Hallux. Without redness or erythema, no systemic symptoms, denies trauma but poor historian. Follows regularly with podiatry. With left foot onychomycosis, with history of cellulitis of foot. Suspect trauma at this time given overlying bruising, lack of erythema, fever, or systemic symptoms. Will order XR of L Foot, will follow up with results. If XR is benign, will treat as cellulitis with 10d Bactrim. Recommend maintaining follow up with podiatry on the  or earlier if desired.     Orders:  -     XR foot 3+ vw left; Future; Expected date: 2024         Subjective      Patient presents with caretaker for pain of left foot and difficulty with ambulation. PMHx of trisomy 21, CKD, HLD, early alzheimer's, aided with history by caretaker. Has had left foot pain and difficulty with ambulation. Caretaker states it is improving today but he had a similar episode around the sparkle holiday. Seemed to have resolved spontaneously, has not been complaining the past few weeks. Patient denies any trauma to the area and doesn't recall any specific triggering event. States that his foot hurts and points to the first metatarsophalangeal joint. Pain is only left sided, denies any distal numbness or tingling, fever, nausea, vomiting or anything else.     Review of Systems   Constitutional:  Negative for chills, fatigue and fever.   HENT:  Negative for congestion.    Respiratory:  Negative for chest tightness and shortness of breath.    Cardiovascular:  Negative for chest pain and palpitations.   Gastrointestinal:  Negative for abdominal pain, diarrhea, nausea and vomiting.   Genitourinary:   "Negative for difficulty urinating.   Musculoskeletal:  Negative for back pain and myalgias.   Skin:  Positive for color change. Negative for rash and wound.   Neurological:  Negative for headaches.       Current Outpatient Medications on File Prior to Visit   Medication Sig    carbamide peroxide (DEBROX) 6.5 % otic solution Administer 5 drops into both ears daily at bedtime    Cetirizine HCl 10 MG CAPS Take 1 tablet by mouth    clotrimazole-betamethasone (LOTRISONE) 1-0.05 % cream Apply topically 2 (two) times a day    fluticasone (FLONASE) 50 mcg/act nasal spray 1 spray into each nostril daily    ibuprofen (MOTRIN) 600 mg tablet Take 1 tablet (600 mg total) by mouth every 6 (six) hours as needed for mild pain.    white petrolatum ointment Apply topically 2 (two) times a day as needed for dry skin       Objective     /82 (BP Location: Left arm, Patient Position: Sitting, Cuff Size: Standard)   Pulse 89   Temp 98.7 °F (37.1 °C) (Temporal)   Ht 5' 1\" (1.549 m)   Wt 96.2 kg (212 lb)   SpO2 98%   BMI 40.06 kg/m²     Physical Exam  Constitutional:       General: He is not in acute distress.     Appearance: Normal appearance. He is normal weight.   HENT:      Head: Normocephalic and atraumatic.      Right Ear: External ear normal.      Left Ear: External ear normal.      Nose: Nose normal.   Eyes:      Conjunctiva/sclera: Conjunctivae normal.   Cardiovascular:      Rate and Rhythm: Normal rate and regular rhythm.      Pulses:           Dorsalis pedis pulses are 2+ on the right side and 2+ on the left side.      Heart sounds: Normal heart sounds. No murmur heard.  Pulmonary:      Effort: No respiratory distress.      Breath sounds: Normal breath sounds. No wheezing.   Abdominal:      General: Bowel sounds are normal.      Palpations: Abdomen is soft.      Tenderness: There is no abdominal tenderness.   Feet:      Right foot:      Toenail Condition: Fungal disease present.     Left foot:      Skin integrity: " Callus and dry skin present. No blister, erythema or warmth.      Toenail Condition: Left toenails are abnormally thick and long. Fungal disease present.     Comments: Bruising of skin overlying left metatarsophalangeal joint.  Skin:     General: Skin is warm and dry.   Neurological:      General: No focal deficit present.      Mental Status: He is alert and oriented to person, place, and time.   Psychiatric:         Mood and Affect: Mood normal.             Ash Viramontes MD

## 2024-01-10 DIAGNOSIS — M79.672 LEFT FOOT PAIN: Primary | ICD-10-CM

## 2024-01-10 RX ORDER — SULFAMETHOXAZOLE AND TRIMETHOPRIM 800; 160 MG/1; MG/1
1 TABLET ORAL 2 TIMES DAILY
Qty: 20 TABLET | Refills: 0 | Status: SHIPPED | OUTPATIENT
Start: 2024-01-10 | End: 2024-01-20

## 2024-01-25 ENCOUNTER — OFFICE VISIT (OUTPATIENT)
Dept: PODIATRY | Facility: CLINIC | Age: 39
End: 2024-01-25
Payer: COMMERCIAL

## 2024-01-25 VITALS
BODY MASS INDEX: 40.22 KG/M2 | WEIGHT: 213 LBS | HEART RATE: 53 BPM | HEIGHT: 61 IN | DIASTOLIC BLOOD PRESSURE: 85 MMHG | SYSTOLIC BLOOD PRESSURE: 120 MMHG | RESPIRATION RATE: 18 BRPM

## 2024-01-25 DIAGNOSIS — M10.9 GOUT OF LEFT FOOT, UNSPECIFIED CAUSE, UNSPECIFIED CHRONICITY: Primary | ICD-10-CM

## 2024-01-25 DIAGNOSIS — L60.3 NAIL DYSTROPHY: ICD-10-CM

## 2024-01-25 PROCEDURE — 99212 OFFICE O/P EST SF 10 MIN: CPT | Performed by: PODIATRIST

## 2024-01-25 NOTE — PROGRESS NOTES
Patient presents with his aide for toenail care.  The aide mentions that at Tonio time, the patient had severe pain in his left great toe joint.  This pain occurred with no recalled trauma.  He went to his medical doctor and was told that he may have an infection.  He was placed on antibiotics with questionable improvement.  A few weeks ago he had a similar attack but today he is comfortable.    On exam, hallux valgus deformity bilateral.  Pedal pulses are palpable.  All toenails are elongated.    Treatment consisted of nail trimming.  Patient was referred for uric acid studies as I would like to rule out gout as causative for his pain.  He will be contacted with results.  Reappoint 3 months.  He was told to contact me should he have pain in the future.

## 2024-01-31 ENCOUNTER — RA CDI HCC (OUTPATIENT)
Dept: OTHER | Facility: HOSPITAL | Age: 39
End: 2024-01-31

## 2024-02-20 ENCOUNTER — TELEPHONE (OUTPATIENT)
Dept: FAMILY MEDICINE CLINIC | Facility: CLINIC | Age: 39
End: 2024-02-20

## 2024-02-20 NOTE — TELEPHONE ENCOUNTER
Called and LVM for patient to complete their lab work before his appointment on 2/22 with Dr. Snow. Karri WEINER

## 2024-02-21 PROBLEM — H61.23 BILATERAL IMPACTED CERUMEN: Status: RESOLVED | Noted: 2020-07-25 | Resolved: 2024-02-21

## 2024-02-24 ENCOUNTER — APPOINTMENT (OUTPATIENT)
Dept: LAB | Facility: CLINIC | Age: 39
End: 2024-02-24
Payer: COMMERCIAL

## 2024-02-24 DIAGNOSIS — Z13.0 SCREENING FOR DEFICIENCY ANEMIA: ICD-10-CM

## 2024-02-24 DIAGNOSIS — Z13.29 SCREENING FOR HYPOTHYROIDISM: ICD-10-CM

## 2024-02-24 DIAGNOSIS — F79 INTELLECTUAL FUNCTIONING DISABILITY: ICD-10-CM

## 2024-02-24 DIAGNOSIS — Z13.6 SCREENING FOR CARDIOVASCULAR CONDITION: ICD-10-CM

## 2024-02-24 DIAGNOSIS — E53.8 LOW SERUM VITAMIN B12: ICD-10-CM

## 2024-02-24 DIAGNOSIS — Z11.59 ENCOUNTER FOR HEPATITIS C SCREENING TEST FOR LOW RISK PATIENT: ICD-10-CM

## 2024-02-24 DIAGNOSIS — Q90.9 COMPLETE TRISOMY 21 SYNDROME: ICD-10-CM

## 2024-02-24 DIAGNOSIS — E55.9 VITAMIN D DEFICIENCY: ICD-10-CM

## 2024-02-24 DIAGNOSIS — M10.9 GOUT OF LEFT FOOT, UNSPECIFIED CAUSE, UNSPECIFIED CHRONICITY: ICD-10-CM

## 2024-02-24 DIAGNOSIS — Z11.4 SCREENING FOR HIV (HUMAN IMMUNODEFICIENCY VIRUS): ICD-10-CM

## 2024-02-24 DIAGNOSIS — Z13.220 SCREENING FOR LIPID DISORDERS: ICD-10-CM

## 2024-02-24 LAB
25(OH)D3 SERPL-MCNC: 14.2 NG/ML (ref 30–100)
ALBUMIN SERPL BCP-MCNC: 3.7 G/DL (ref 3.5–5)
ALP SERPL-CCNC: 55 U/L (ref 34–104)
ALT SERPL W P-5'-P-CCNC: 22 U/L (ref 7–52)
ANION GAP SERPL CALCULATED.3IONS-SCNC: 5 MMOL/L
AST SERPL W P-5'-P-CCNC: 19 U/L (ref 13–39)
BILIRUB SERPL-MCNC: 0.53 MG/DL (ref 0.2–1)
BUN SERPL-MCNC: 16 MG/DL (ref 5–25)
CALCIUM SERPL-MCNC: 8.8 MG/DL (ref 8.4–10.2)
CHLORIDE SERPL-SCNC: 101 MMOL/L (ref 96–108)
CHOLEST SERPL-MCNC: 294 MG/DL
CO2 SERPL-SCNC: 31 MMOL/L (ref 21–32)
CREAT SERPL-MCNC: 1.51 MG/DL (ref 0.6–1.3)
ERYTHROCYTE [DISTWIDTH] IN BLOOD BY AUTOMATED COUNT: 14.8 % (ref 11.6–15.1)
EST. AVERAGE GLUCOSE BLD GHB EST-MCNC: 111 MG/DL
FOLATE SERPL-MCNC: 13.6 NG/ML
GFR SERPL CREATININE-BSD FRML MDRD: 56 ML/MIN/1.73SQ M
GLUCOSE P FAST SERPL-MCNC: 92 MG/DL (ref 65–99)
HBA1C MFR BLD: 5.5 %
HCT VFR BLD AUTO: 53.1 % (ref 36.5–49.3)
HCV AB SER QL: NORMAL
HDLC SERPL-MCNC: 46 MG/DL
HGB BLD-MCNC: 17.5 G/DL (ref 12–17)
HIV 1+2 AB+HIV1 P24 AG SERPL QL IA: NORMAL
HIV 2 AB SERPL QL IA: NORMAL
HIV1 AB SERPL QL IA: NORMAL
HIV1 P24 AG SERPL QL IA: NORMAL
LDLC SERPL CALC-MCNC: 182 MG/DL (ref 0–100)
MCH RBC QN AUTO: 32.3 PG (ref 26.8–34.3)
MCHC RBC AUTO-ENTMCNC: 33 G/DL (ref 31.4–37.4)
MCV RBC AUTO: 98 FL (ref 82–98)
NONHDLC SERPL-MCNC: 248 MG/DL
PLATELET # BLD AUTO: 299 THOUSANDS/UL (ref 149–390)
PMV BLD AUTO: 10 FL (ref 8.9–12.7)
POTASSIUM SERPL-SCNC: 3.9 MMOL/L (ref 3.5–5.3)
PROT SERPL-MCNC: 7.3 G/DL (ref 6.4–8.4)
RBC # BLD AUTO: 5.41 MILLION/UL (ref 3.88–5.62)
SODIUM SERPL-SCNC: 137 MMOL/L (ref 135–147)
T4 FREE SERPL-MCNC: 0.82 NG/DL (ref 0.61–1.12)
TRIGL SERPL-MCNC: 332 MG/DL
TSH SERPL DL<=0.05 MIU/L-ACNC: 4.74 UIU/ML (ref 0.45–4.5)
URATE SERPL-MCNC: 9.6 MG/DL (ref 3.5–8.5)
VIT B12 SERPL-MCNC: 425 PG/ML (ref 180–914)
WBC # BLD AUTO: 6.44 THOUSAND/UL (ref 4.31–10.16)

## 2024-02-24 PROCEDURE — 85027 COMPLETE CBC AUTOMATED: CPT

## 2024-02-24 PROCEDURE — 82306 VITAMIN D 25 HYDROXY: CPT

## 2024-02-24 PROCEDURE — 82607 VITAMIN B-12: CPT

## 2024-02-24 PROCEDURE — 84443 ASSAY THYROID STIM HORMONE: CPT

## 2024-02-24 PROCEDURE — 84439 ASSAY OF FREE THYROXINE: CPT

## 2024-02-24 PROCEDURE — 82746 ASSAY OF FOLIC ACID SERUM: CPT

## 2024-02-24 PROCEDURE — 80061 LIPID PANEL: CPT

## 2024-02-24 PROCEDURE — 80053 COMPREHEN METABOLIC PANEL: CPT

## 2024-02-24 PROCEDURE — 86803 HEPATITIS C AB TEST: CPT

## 2024-02-24 PROCEDURE — 87389 HIV-1 AG W/HIV-1&-2 AB AG IA: CPT

## 2024-02-24 PROCEDURE — 84550 ASSAY OF BLOOD/URIC ACID: CPT

## 2024-02-24 PROCEDURE — 36415 COLL VENOUS BLD VENIPUNCTURE: CPT

## 2024-02-24 PROCEDURE — 83036 HEMOGLOBIN GLYCOSYLATED A1C: CPT

## 2024-02-27 DIAGNOSIS — E55.9 VITAMIN D DEFICIENCY: Primary | ICD-10-CM

## 2024-02-27 RX ORDER — ERGOCALCIFEROL 1.25 MG/1
50000 CAPSULE ORAL WEEKLY
Qty: 6 CAPSULE | Refills: 0 | Status: SHIPPED | OUTPATIENT
Start: 2024-02-27

## 2024-02-27 NOTE — TELEPHONE ENCOUNTER
Bianca called for pt in regards to patients lab work, stating the pts podiatrist recommended the Pcp look at the labs as they seemed abnormal and the pt may need to start a medication,       Pharmacy: [facility] Bianca Ward: [phone]

## 2024-03-18 ENCOUNTER — APPOINTMENT (OUTPATIENT)
Dept: LAB | Facility: CLINIC | Age: 39
End: 2024-03-18
Payer: COMMERCIAL

## 2024-03-18 ENCOUNTER — TELEPHONE (OUTPATIENT)
Dept: FAMILY MEDICINE CLINIC | Facility: CLINIC | Age: 39
End: 2024-03-18

## 2024-03-18 ENCOUNTER — OFFICE VISIT (OUTPATIENT)
Dept: FAMILY MEDICINE CLINIC | Facility: CLINIC | Age: 39
End: 2024-03-18

## 2024-03-18 VITALS
OXYGEN SATURATION: 99 % | BODY MASS INDEX: 40.4 KG/M2 | RESPIRATION RATE: 18 BRPM | SYSTOLIC BLOOD PRESSURE: 128 MMHG | DIASTOLIC BLOOD PRESSURE: 86 MMHG | WEIGHT: 214 LBS | TEMPERATURE: 98.6 F | HEART RATE: 73 BPM | HEIGHT: 61 IN

## 2024-03-18 DIAGNOSIS — E78.5 HYPERLIPIDEMIA, UNSPECIFIED HYPERLIPIDEMIA TYPE: ICD-10-CM

## 2024-03-18 DIAGNOSIS — E03.8 SUBCLINICAL HYPOTHYROIDISM: ICD-10-CM

## 2024-03-18 DIAGNOSIS — N18.2 CKD (CHRONIC KIDNEY DISEASE) STAGE 2, GFR 60-89 ML/MIN: ICD-10-CM

## 2024-03-18 DIAGNOSIS — J30.2 ACUTE SEASONAL ALLERGIC RHINITIS: Primary | ICD-10-CM

## 2024-03-18 DIAGNOSIS — H61.23 BILATERAL IMPACTED CERUMEN: ICD-10-CM

## 2024-03-18 DIAGNOSIS — S00.511A ABRASION OF LIP, INITIAL ENCOUNTER: ICD-10-CM

## 2024-03-18 LAB
ANION GAP SERPL CALCULATED.3IONS-SCNC: 7 MMOL/L (ref 4–13)
BUN SERPL-MCNC: 20 MG/DL (ref 5–25)
CALCIUM SERPL-MCNC: 8.7 MG/DL (ref 8.4–10.2)
CHLORIDE SERPL-SCNC: 101 MMOL/L (ref 96–108)
CO2 SERPL-SCNC: 29 MMOL/L (ref 21–32)
CREAT SERPL-MCNC: 1.43 MG/DL (ref 0.6–1.3)
GFR SERPL CREATININE-BSD FRML MDRD: 60 ML/MIN/1.73SQ M
GLUCOSE SERPL-MCNC: 92 MG/DL (ref 65–140)
POTASSIUM SERPL-SCNC: 4.1 MMOL/L (ref 3.5–5.3)
SODIUM SERPL-SCNC: 137 MMOL/L (ref 135–147)
TSH SERPL DL<=0.05 MIU/L-ACNC: 2.55 UIU/ML (ref 0.45–4.5)

## 2024-03-18 PROCEDURE — 99213 OFFICE O/P EST LOW 20 MIN: CPT | Performed by: FAMILY MEDICINE

## 2024-03-18 PROCEDURE — 84443 ASSAY THYROID STIM HORMONE: CPT

## 2024-03-18 PROCEDURE — G2211 COMPLEX E/M VISIT ADD ON: HCPCS | Performed by: FAMILY MEDICINE

## 2024-03-18 PROCEDURE — 80048 BASIC METABOLIC PNL TOTAL CA: CPT

## 2024-03-18 PROCEDURE — 36415 COLL VENOUS BLD VENIPUNCTURE: CPT

## 2024-03-18 RX ORDER — FLUTICASONE PROPIONATE 50 MCG
1 SPRAY, SUSPENSION (ML) NASAL DAILY
Qty: 15.8 ML | Refills: 1 | Status: SHIPPED | OUTPATIENT
Start: 2024-03-18

## 2024-03-18 NOTE — ASSESSMENT & PLAN NOTE
Lab Results   Component Value Date    EGFR 56 02/24/2024    EGFR 64 10/08/2022    EGFR 64 09/04/2021    CREATININE 1.51 (H) 02/24/2024    CREATININE 1.37 (H) 10/08/2022    CREATININE 1.39 (H) 09/04/2021   -most recent labwork shows acute bump in Cr from 1.37 to 1.51 (baseline appears to be ~1.30 so this does not meet TJ criteria)     Plan:  Repeat BMP and return for follow up in 1 week to reassess  Encouraged patient to drink plenty of water, 60 fl oz per day

## 2024-03-18 NOTE — ASSESSMENT & PLAN NOTE
-cut on lip present since Dec  -does not appear infected    Plan:  Recommend keeping area clean, apply generous amount of vasaline daily

## 2024-03-18 NOTE — PROGRESS NOTES
Assessment/Plan:    CKD (chronic kidney disease) stage 2, GFR 60-89 ml/min  Lab Results   Component Value Date    EGFR 56 02/24/2024    EGFR 64 10/08/2022    EGFR 64 09/04/2021    CREATININE 1.51 (H) 02/24/2024    CREATININE 1.37 (H) 10/08/2022    CREATININE 1.39 (H) 09/04/2021   -most recent labwork shows acute bump in Cr from 1.37 to 1.51 (baseline appears to be ~1.30 so this does not meet TJ criteria)     Plan:  Repeat BMP and return for follow up in 1 week to reassess  Encouraged patient to drink plenty of water, 60 fl oz per day    Subclinical hypothyroidism  -TSH is 4.73, but T4 WNL  -pt asymptomatic     Plan:  Repeat TSH in 8 weeks    Bilateral impacted cerumen  -b/l ear canals occluded with cerumen today    Plan:  Refilled debrox today, encouraged patient to use daily   Return for follow up, ear debridement in 2 weeks     Abrasion of lip  -cut on lip present since Dec  -does not appear infected    Plan:  Recommend keeping area clean, apply generous amount of vasaline daily       Diagnoses and all orders for this visit:    Acute seasonal allergic rhinitis, unspecified trigger  -     fluticasone (FLONASE) 50 mcg/act nasal spray; 1 spray into each nostril daily  -     Cetirizine HCl 10 MG CAPS; Take 1 capsule (10 mg total) by mouth in the morning    Bilateral impacted cerumen  -     carbamide peroxide (DEBROX) 6.5 % otic solution; Administer 5 drops into both ears daily at bedtime    CKD (chronic kidney disease) stage 2, GFR 60-89 ml/min  -     Basic metabolic panel; Future    Subclinical hypothyroidism  -     Cancel: TSH, 3rd generation with Free T4 reflex; Future  -     TSH, 3rd generation with Free T4 reflex; Future    Hyperlipidemia, unspecified hyperlipidemia type    Abrasion of lip, initial encounter          Subjective:      Patient ID: Nitesh Frankel is a 38 y.o. male.    Patient presents for labwork review and to discuss a few other issues.     Caretaker also notices he has had a cut on his lip since  "December that is not healing. She is also concerned he may need his ears cleaned. Also concerned his allergies are acting up as he has been more congested than usual and his face is more red than usual.         The following portions of the patient's history were reviewed and updated as appropriate: allergies, current medications, past family history, past medical history, past social history, past surgical history, and problem list.    Review of Systems   Constitutional:  Negative for chills and fever.   HENT:  Negative for ear pain and sore throat.    Eyes:  Negative for pain and visual disturbance.   Respiratory:  Negative for cough and shortness of breath.    Cardiovascular:  Negative for chest pain and palpitations.   Gastrointestinal:  Negative for abdominal pain, constipation and vomiting.   Genitourinary:  Negative for dysuria and hematuria.   Musculoskeletal:  Negative for arthralgias and back pain.   Skin:  Negative for color change and rash.   Neurological:  Negative for seizures and syncope.   All other systems reviewed and are negative.        Objective:      /86 (BP Location: Right arm, Patient Position: Sitting, Cuff Size: Large)   Pulse 73   Temp 98.6 °F (37 °C) (Temporal)   Resp 18   Ht 5' 1\" (1.549 m)   Wt 97.1 kg (214 lb)   SpO2 99%   BMI 40.43 kg/m²          Physical Exam  Constitutional:       General: He is not in acute distress.     Appearance: He is not ill-appearing or toxic-appearing.   HENT:      Head: Normocephalic and atraumatic.      Right Ear: External ear normal.      Left Ear: External ear normal.      Nose: Nose normal.      Mouth/Throat:      Mouth: Mucous membranes are moist.      Pharynx: Oropharynx is clear.   Eyes:      General: No scleral icterus.        Right eye: No discharge.         Left eye: No discharge.      Conjunctiva/sclera: Conjunctivae normal.   Cardiovascular:      Rate and Rhythm: Normal rate and regular rhythm.      Pulses: Normal pulses.      Heart " sounds: Normal heart sounds. No murmur heard.  Pulmonary:      Effort: Pulmonary effort is normal. No respiratory distress.      Breath sounds: Normal breath sounds. No wheezing, rhonchi or rales.   Abdominal:      General: Bowel sounds are normal.      Palpations: Abdomen is soft.      Tenderness: There is no abdominal tenderness. There is no guarding.      Hernia: No hernia is present.   Musculoskeletal:         General: No swelling. Normal range of motion.      Right lower leg: No edema.      Left lower leg: No edema.   Skin:     General: Skin is warm and dry.      Coloration: Skin is not jaundiced.   Neurological:      General: No focal deficit present.      Mental Status: He is alert and oriented to person, place, and time. Mental status is at baseline.   Psychiatric:         Mood and Affect: Mood normal.         Behavior: Behavior normal.

## 2024-03-18 NOTE — ASSESSMENT & PLAN NOTE
-b/l ear canals occluded with cerumen today    Plan:  Refilled debrox today, encouraged patient to use daily   Return for follow up, ear debridement in 2 weeks

## 2024-03-25 ENCOUNTER — OFFICE VISIT (OUTPATIENT)
Dept: FAMILY MEDICINE CLINIC | Facility: CLINIC | Age: 39
End: 2024-03-25

## 2024-03-25 VITALS
BODY MASS INDEX: 40.59 KG/M2 | WEIGHT: 214.8 LBS | HEART RATE: 63 BPM | SYSTOLIC BLOOD PRESSURE: 123 MMHG | RESPIRATION RATE: 20 BRPM | TEMPERATURE: 98.2 F | DIASTOLIC BLOOD PRESSURE: 78 MMHG | OXYGEN SATURATION: 99 %

## 2024-03-25 DIAGNOSIS — N18.2 CKD (CHRONIC KIDNEY DISEASE) STAGE 2, GFR 60-89 ML/MIN: ICD-10-CM

## 2024-03-25 DIAGNOSIS — Z11.1 SCREENING FOR TUBERCULOSIS: Primary | ICD-10-CM

## 2024-03-25 DIAGNOSIS — H61.23 BILATERAL IMPACTED CERUMEN: ICD-10-CM

## 2024-03-25 PROCEDURE — 99213 OFFICE O/P EST LOW 20 MIN: CPT | Performed by: FAMILY MEDICINE

## 2024-03-25 PROCEDURE — G2211 COMPLEX E/M VISIT ADD ON: HCPCS | Performed by: FAMILY MEDICINE

## 2024-03-25 NOTE — ASSESSMENT & PLAN NOTE
Lab Results   Component Value Date    EGFR 60 03/18/2024    EGFR 56 02/24/2024    EGFR 64 10/08/2022    CREATININE 1.43 (H) 03/18/2024    CREATININE 1.51 (H) 02/24/2024    CREATININE 1.37 (H) 10/08/2022   -reviewed labwork from 3/18, shows Cr is trending down towards baseline. Continue to encourage adequate hydration

## 2024-03-25 NOTE — ASSESSMENT & PLAN NOTE
-b/l ear irrigation performed today, patient tolerated without complication. Cerumen cleared from b/l ear canals and Tms visualized and WNL     Plan:  Continue debrox ear drops daily

## 2024-03-25 NOTE — PROGRESS NOTES
Assessment/Plan:    Bilateral impacted cerumen  -b/l ear irrigation performed today, patient tolerated without complication. Cerumen cleared from b/l ear canals and Tms visualized and WNL     Plan:  Continue debrox ear drops daily    CKD (chronic kidney disease) stage 2, GFR 60-89 ml/min  Lab Results   Component Value Date    EGFR 60 03/18/2024    EGFR 56 02/24/2024    EGFR 64 10/08/2022    CREATININE 1.43 (H) 03/18/2024    CREATININE 1.51 (H) 02/24/2024    CREATININE 1.37 (H) 10/08/2022   -reviewed labwork from 3/18, shows Cr is trending down towards baseline. Continue to encourage adequate hydration       Diagnoses and all orders for this visit:    Screening for tuberculosis  -     Quantiferon TB Gold Plus Assay; Future    Bilateral impacted cerumen    CKD (chronic kidney disease) stage 2, GFR 60-89 ml/min          Subjective:      Patient ID: Nitesh Frankel is a 38 y.o. male.    Pt presents for follow up visit to review labwork and for cerumen disimpaction. Has been using debrox ear drops daily. Denies any other acute complaints or concerns. Needs quantiferon GOLD TB screen for group home.        The following portions of the patient's history were reviewed and updated as appropriate: allergies, current medications, past family history, past medical history, past social history, past surgical history, and problem list.    Review of Systems   Constitutional:  Negative for chills and fever.   HENT:  Negative for ear pain and sore throat.    Eyes:  Negative for pain and visual disturbance.   Respiratory:  Negative for cough and shortness of breath.    Cardiovascular:  Negative for chest pain and palpitations.   Gastrointestinal:  Negative for abdominal pain, constipation and vomiting.   Genitourinary:  Negative for dysuria and hematuria.   Musculoskeletal:  Negative for arthralgias and back pain.   Skin:  Negative for color change and rash.   Neurological:  Negative for seizures and syncope.   All other systems  reviewed and are negative.        Objective:      /78   Pulse 63   Temp 98.2 °F (36.8 °C)   Resp 20   Wt 97.4 kg (214 lb 12.8 oz)   SpO2 99%   BMI 40.59 kg/m²          Physical Exam  Constitutional:       General: He is not in acute distress.     Appearance: He is not ill-appearing or toxic-appearing.   HENT:      Head: Normocephalic and atraumatic.      Right Ear: Tympanic membrane, ear canal and external ear normal. There is impacted cerumen.      Left Ear: Tympanic membrane, ear canal and external ear normal. There is impacted cerumen.      Nose: Nose normal.      Mouth/Throat:      Mouth: Mucous membranes are moist.      Pharynx: Oropharynx is clear.   Eyes:      General: No scleral icterus.     Conjunctiva/sclera: Conjunctivae normal.   Cardiovascular:      Rate and Rhythm: Normal rate and regular rhythm.      Heart sounds: Normal heart sounds. No murmur heard.  Pulmonary:      Effort: Pulmonary effort is normal. No respiratory distress.      Breath sounds: Normal breath sounds. No wheezing, rhonchi or rales.   Abdominal:      General: Bowel sounds are normal.      Palpations: Abdomen is soft.      Tenderness: There is no abdominal tenderness. There is no guarding.      Hernia: No hernia is present.   Musculoskeletal:         General: No swelling.      Right lower leg: No edema.      Left lower leg: No edema.   Skin:     General: Skin is warm and dry.      Coloration: Skin is not jaundiced.   Neurological:      General: No focal deficit present.      Mental Status: He is alert and oriented to person, place, and time. Mental status is at baseline.   Psychiatric:         Mood and Affect: Mood normal.         Behavior: Behavior normal.

## 2024-03-27 ENCOUNTER — TELEPHONE (OUTPATIENT)
Dept: FAMILY MEDICINE CLINIC | Facility: CLINIC | Age: 39
End: 2024-03-27

## 2024-03-27 NOTE — TELEPHONE ENCOUNTER
Folder (To be completed by) -   Dr. Snow    Name of Form -Application for Athlete Participation in [employer]      Color folder ([place])    Form to be Faxed Picked up by  Bianca Hurtado [phone]    Patient was made aware of the 7-10 business day form policy.

## 2024-04-04 ENCOUNTER — APPOINTMENT (OUTPATIENT)
Dept: LAB | Facility: CLINIC | Age: 39
End: 2024-04-04
Payer: COMMERCIAL

## 2024-04-04 DIAGNOSIS — Z11.1 SCREENING FOR TUBERCULOSIS: ICD-10-CM

## 2024-04-04 PROCEDURE — 86480 TB TEST CELL IMMUN MEASURE: CPT

## 2024-04-04 PROCEDURE — 36415 COLL VENOUS BLD VENIPUNCTURE: CPT

## 2024-04-05 LAB
GAMMA INTERFERON BACKGROUND BLD IA-ACNC: 0.08 IU/ML
M TB IFN-G BLD-IMP: NEGATIVE
M TB IFN-G CD4+ BCKGRND COR BLD-ACNC: 0.03 IU/ML
M TB IFN-G CD4+ BCKGRND COR BLD-ACNC: 0.04 IU/ML
MITOGEN IGNF BCKGRD COR BLD-ACNC: 9.92 IU/ML

## 2024-04-25 ENCOUNTER — OFFICE VISIT (OUTPATIENT)
Dept: PODIATRY | Facility: CLINIC | Age: 39
End: 2024-04-25
Payer: COMMERCIAL

## 2024-04-25 VITALS
HEIGHT: 61 IN | RESPIRATION RATE: 17 BRPM | DIASTOLIC BLOOD PRESSURE: 80 MMHG | BODY MASS INDEX: 40.4 KG/M2 | WEIGHT: 214 LBS | HEART RATE: 63 BPM | SYSTOLIC BLOOD PRESSURE: 125 MMHG

## 2024-04-25 DIAGNOSIS — M10.9 GOUT OF LEFT FOOT, UNSPECIFIED CAUSE, UNSPECIFIED CHRONICITY: ICD-10-CM

## 2024-04-25 DIAGNOSIS — B35.1 ONYCHOMYCOSIS: Primary | ICD-10-CM

## 2024-04-25 PROCEDURE — 99212 OFFICE O/P EST SF 10 MIN: CPT | Performed by: PODIATRIST

## 2024-04-25 NOTE — PROGRESS NOTES
Patient presents with an aide.  Reviewed lab work and specifically uric acid level dated 2/24/2024.  Uric acid was elevated at 9.6.    On questioning, patient has not had a flare since last appointment.  Explained to ED that should he have foot swelling and discomfort, it is most likely gout and not infection.  No aggressive treatment needed at this time.    On exam, all toenails left foot are thickened yellowed with subungual debris.  All toenails are elongated.  Treatment consisted of nail trimming.  Reappoint 3 months.

## 2024-05-01 PROBLEM — H61.23 BILATERAL IMPACTED CERUMEN: Status: RESOLVED | Noted: 2024-03-18 | Resolved: 2024-05-01

## 2024-05-13 DIAGNOSIS — J30.2 ACUTE SEASONAL ALLERGIC RHINITIS: ICD-10-CM

## 2024-05-13 RX ORDER — FLUTICASONE PROPIONATE 50 MCG
SPRAY, SUSPENSION (ML) NASAL
Qty: 24 ML | Refills: 2 | Status: SHIPPED | OUTPATIENT
Start: 2024-05-13

## 2024-05-26 DIAGNOSIS — J30.2 ACUTE SEASONAL ALLERGIC RHINITIS: ICD-10-CM

## 2024-05-28 RX ORDER — FLUTICASONE PROPIONATE 50 MCG
SPRAY, SUSPENSION (ML) NASAL
Qty: 24 ML | Refills: 2 | Status: SHIPPED | OUTPATIENT
Start: 2024-05-28

## 2024-08-19 ENCOUNTER — RA CDI HCC (OUTPATIENT)
Dept: OTHER | Facility: HOSPITAL | Age: 39
End: 2024-08-19

## 2024-08-19 NOTE — PROGRESS NOTES
HCC coding opportunities          Chart Reviewed number of suggestions sent to Provider: 1     Patients Insurance     Medicare Insurance: [employer]          E66.01:Morbid (severe) obesity due to excess calories [E66.01]     Per CMS/ICD 10 coding guidelines, to be used when BMI >=40, with BMI code

## 2024-09-04 ENCOUNTER — TELEPHONE (OUTPATIENT)
Dept: FAMILY MEDICINE CLINIC | Facility: CLINIC | Age: 39
End: 2024-09-04

## 2024-09-04 NOTE — TELEPHONE ENCOUNTER
LVM to schedule appointment    Called patient to schedule a missed appointment    Patient can be reached at [phone]

## 2024-09-11 ENCOUNTER — TELEPHONE (OUTPATIENT)
Dept: NEUROLOGY | Facility: CLINIC | Age: 39
End: 2024-09-11

## 2024-09-11 NOTE — TELEPHONE ENCOUNTER
Called patient to confirm upcoming appointment on 9/24/24 with Emile Terry in the [place] office.  Spoke with Bianca, pt caregiver, she confirmed the appt

## 2024-09-13 ENCOUNTER — OFFICE VISIT (OUTPATIENT)
Dept: FAMILY MEDICINE CLINIC | Facility: CLINIC | Age: 39
End: 2024-09-13

## 2024-09-13 ENCOUNTER — TELEPHONE (OUTPATIENT)
Dept: FAMILY MEDICINE CLINIC | Facility: CLINIC | Age: 39
End: 2024-09-13

## 2024-09-13 VITALS
BODY MASS INDEX: 41.04 KG/M2 | WEIGHT: 217.4 LBS | DIASTOLIC BLOOD PRESSURE: 75 MMHG | HEART RATE: 56 BPM | HEIGHT: 61 IN | OXYGEN SATURATION: 97 % | SYSTOLIC BLOOD PRESSURE: 115 MMHG | RESPIRATION RATE: 18 BRPM | TEMPERATURE: 98.6 F

## 2024-09-13 DIAGNOSIS — E78.5 HYPERLIPIDEMIA, UNSPECIFIED HYPERLIPIDEMIA TYPE: ICD-10-CM

## 2024-09-13 DIAGNOSIS — Z13.6 ENCOUNTER FOR SCREENING FOR CARDIOVASCULAR DISORDERS: ICD-10-CM

## 2024-09-13 DIAGNOSIS — Z00.00 MEDICARE ANNUAL WELLNESS VISIT, SUBSEQUENT: Primary | ICD-10-CM

## 2024-09-13 DIAGNOSIS — Z13.1 ENCOUNTER FOR SCREENING FOR DIABETES MELLITUS: ICD-10-CM

## 2024-09-13 DIAGNOSIS — M1A.9XX0 CHRONIC GOUT INVOLVING TOE OF RIGHT FOOT WITHOUT TOPHUS, UNSPECIFIED CAUSE: ICD-10-CM

## 2024-09-13 DIAGNOSIS — E55.9 VITAMIN D DEFICIENCY: ICD-10-CM

## 2024-09-13 DIAGNOSIS — Q90.9 COMPLETE TRISOMY 21 SYNDROME: ICD-10-CM

## 2024-09-13 PROCEDURE — G0439 PPPS, SUBSEQ VISIT: HCPCS | Performed by: FAMILY MEDICINE

## 2024-09-13 RX ORDER — ALLOPURINOL 100 MG/1
100 TABLET ORAL DAILY
Qty: 90 TABLET | Refills: 1 | Status: SHIPPED | OUTPATIENT
Start: 2024-09-13 | End: 2025-03-12

## 2024-09-13 NOTE — ASSESSMENT & PLAN NOTE
BMI Counseling: Body mass index is 41.08 kg/m². The BMI is above normal. Nutrition recommendations include 3-5 servings of fruits/vegetables daily, reducing fast food intake, moderation in carbohydrate intake, increasing intake of lean protein, reducing intake of saturated fat and trans fat, and reducing intake of cholesterol. Exercise recommendations include moderate aerobic physical activity for 150 minutes/week.    Orders:    Hemoglobin A1C; Future    Lipid panel; Future    Comprehensive metabolic panel; Future

## 2024-09-13 NOTE — PROGRESS NOTES
Ambulatory Visit  Name: Nitesh Frankel      : 1985      MRN: [id number]  Encounter Provider: Karen Contreras DO  Encounter Date: 2024   Encounter department: [facility]  Medicare annual wellness visit, subsequent  Vaccine:  -flu vaccine - reminded for the fall  -TDaP vaccine - last done in     Screening:  -depression 0 (PHQ-2 score)  -No family history of early onset cancers       Complete trisomy 21 syndrome  Will order cardiovascular screen, annual thyroid screen.  Caregiver Bianca Hurtado states that cognitively patient has not had any major changes.  Scored an 8 on the mini cog today.  Orders:    Hemoglobin A1C; Future    Lipid panel; Future    TSH, 3rd generation with Free T4 reflex; Future    BMI 40.0-44.9, adult (HCC)  BMI Counseling: Body mass index is 41.08 kg/m². The BMI is above normal. Nutrition recommendations include 3-5 servings of fruits/vegetables daily, reducing fast food intake, moderation in carbohydrate intake, increasing intake of lean protein, reducing intake of saturated fat and trans fat, and reducing intake of cholesterol. Exercise recommendations include moderate aerobic physical activity for 150 minutes/week.    Orders:    Hemoglobin A1C; Future    Lipid panel; Future    Comprehensive metabolic panel; Future    Encounter for screening for cardiovascular disorders    Orders:    Lipid panel; Future    Encounter for screening for diabetes mellitus    Orders:    Hemoglobin A1C; Future    Hyperlipidemia, unspecified hyperlipidemia type    Orders:    TSH, 3rd generation with Free T4 reflex; Future    Chronic gout involving toe of right foot without tophus, unspecified cause  Uric acid level elevated at 9.6.  Per caregiver patient has had 3 flares in the last year.  Per chart review, there has been 2 documented flares.  Will start allopurinol 100 mg daily.  Counseled patient and caregiver on diet modifications.   Repeat uric acid levels in approximately 2 months.  Caregiver can call if there is another acute gout flare.  Orders:    allopurinol (ZYLOPRIM) 100 mg tablet; Take 1 tablet (100 mg total) by mouth daily    Uric acid; Future    Vitamin D deficiency  Earlier this year was found to be vitamin D deficient and provided 6-week course of 50,000 IU of vitamin D.  Will recheck since already following.  Patient in the meantime can take multivitamins.  Orders:    Vitamin D 25 hydroxy; Future       Preventive health issues were discussed with patient, and age appropriate screening tests were ordered as noted in patient's After Visit Summary. Personalized health advice and appropriate referrals for health education or preventive services given if needed, as noted in patient's After Visit Summary.      History of Present Illness     38-year-old male who presents today for Medicare annual wellness.  Patient presents today with Bianca Hurtado, who was caregiver from [employer].  She has been his caregiver for the last 7 years.  PMHx includes trisomy 21, obesity, hyperlipidemia.  Denies any history of congenital heart disease.  Denies any history of cancers.       Patient Care Team:  Sukhdeep Marie DO as PCP - General (Family Medicine)  MD Светлана Geronimo MD    Review of Systems   Constitutional:  Negative for chills, fatigue and fever.   HENT:  Negative for congestion, hearing loss, rhinorrhea and sore throat.    Eyes:  Negative for visual disturbance.   Respiratory:  Negative for cough and shortness of breath.    Cardiovascular:  Negative for chest pain and palpitations.   Gastrointestinal:  Negative for abdominal pain, blood in stool, constipation, diarrhea, nausea and vomiting.   Genitourinary:  Negative for dysuria and hematuria.   Skin:  Negative for rash.   Neurological:  Negative for dizziness, light-headedness, numbness and headaches.     Medical History Reviewed by provider this  encounter:       Annual Wellness Visit Questionnaire   Nitesh is here for his Subsequent Wellness visit.     Health Risk Assessment:   Patient rates overall health as fair. Patient feels that their physical health rating is same. Patient is satisfied with their life. Eyesight was rated as same. Hearing was rated as same. Patient feels that their emotional and mental health rating is same. Patients states they are never, rarely angry. Patient states they are never, rarely unusually tired/fatigued. Pain experienced in the last 7 days has been none. Patient states that he has experienced no weight loss or gain in last 6 months.     Depression Screening:   PHQ-2 Score: 0      Fall Risk Screening:   In the past year, patient has experienced: no history of falling in past year      Home Safety:  Patient does not have trouble with stairs inside or outside of their home. Patient has working smoke alarms and has working carbon monoxide detector. Home safety hazards include: none.     Nutrition:   Current diet is Regular.     Medications:   Patient is not currently taking any over-the-counter supplements. Patient is able to manage medications.     Activities of Daily Living (ADLs)/Instrumental Activities of Daily Living (IADLs):   Walk and transfer into and out of bed and chair?: Yes  Dress and groom yourself?: Yes    Bathe or shower yourself?: Yes    Feed yourself? Yes  Do your laundry/housekeeping?: No  Manage your money, pay your bills and track your expenses?: No  Make your own meals?: No    Do your own shopping?: No    Previous Hospitalizations:   Any hospitalizations or ED visits within the last 12 months?: No      Advance Care Planning:   Living will: No    Durable POA for healthcare: No    Advanced directive: No      Cognitive Screening:   Provider or family/friend/caregiver concerned regarding cognition?: No    PREVENTIVE SCREENINGS      Cardiovascular Screening:    General: Screening Not Indicated and History Lipid  Disorder      Diabetes Screening:     General: Screening Current      Prostate Cancer Screening:    General: Screening Not Indicated      Lung Cancer Screening:     General: Screening Not Indicated      Hepatitis C Screening:    General: Screening Current    Screening, Brief Intervention, and Referral to Treatment (SBIRT)    Screening      AUDIT-C Screenin) How often did you have a drink containing alcohol in the past year? never  2) How many drinks did you have on a typical day when you were drinking in the past year? 0  3) How often did you have 6 or more drinks on one occasion in the past year? never    AUDIT-C Score: 0  Interpretation: Score 0-3 (male): Negative screen for alcohol misuse    Single Item Drug Screening:  How often have you used an illegal drug (including marijuana) or a prescription medication for non-medical reasons in the past year? never    Single Item Drug Screen Score: 0  Interpretation: Negative screen for possible drug use disorder    Social Determinants of Health     Financial Resource Strain: Low Risk  (2024)    Overall Financial Resource Strain (CARDIA)     Difficulty of Paying Living Expenses: Not hard at all   Food Insecurity: No Food Insecurity (2024)    Hunger Vital Sign     Worried About Running Out of Food in the Last Year: Never true     Ran Out of Food in the Last Year: Never true   Transportation Needs: No Transportation Needs (2024)    PRAPARE - Transportation     Lack of Transportation (Medical): No     Lack of Transportation (Non-Medical): No   Housing Stability: Low Risk  (2024)    Housing Stability Vital Sign     Unable to Pay for Housing in the Last Year: No     Number of Times Moved in the Last Year: 1     Homeless in the Last Year: No   Utilities: Not At Risk (2024)    [facility] Utilities     Threatened with loss of utilities: No     No results found.    Objective     /75 (BP Location: Left arm, Patient Position: Sitting, Cuff Size: Large)    "Pulse 56   Temp 98.6 °F (37 °C) (Temporal)   Resp 18   Ht 5' 1\" (1.549 m)   Wt 98.6 kg (217 lb 6.4 oz)   SpO2 97%   BMI 41.08 kg/m²     Physical Exam  Vitals reviewed.   Constitutional:       General: He is not in acute distress.     Appearance: He is not ill-appearing, toxic-appearing or diaphoretic.   HENT:      Head: Normocephalic and atraumatic.      Right Ear: There is impacted cerumen.      Left Ear: There is impacted cerumen.      Ears:      Comments: No mastoid tenderness/erythema.  No pain with pinna manipulation.     Nose: Nose normal. No congestion or rhinorrhea.      Mouth/Throat:      Mouth: Mucous membranes are moist.      Pharynx: Oropharynx is clear. No oropharyngeal exudate or posterior oropharyngeal erythema.   Eyes:      General: No scleral icterus.        Right eye: No discharge.         Left eye: No discharge.      Extraocular Movements: Extraocular movements intact.      Conjunctiva/sclera: Conjunctivae normal.      Pupils: Pupils are equal, round, and reactive to light.   Cardiovascular:      Rate and Rhythm: Normal rate and regular rhythm.      Pulses: Normal pulses.      Heart sounds: Normal heart sounds. No murmur heard.     No friction rub. No gallop.   Pulmonary:      Effort: Pulmonary effort is normal. No respiratory distress.      Breath sounds: Normal breath sounds. No stridor. No wheezing, rhonchi or rales.   Chest:      Chest wall: No tenderness.   Abdominal:      General: Abdomen is flat. There is no distension.      Palpations: Abdomen is soft. There is no mass.      Tenderness: There is no abdominal tenderness. There is no guarding or rebound.      Hernia: No hernia is present.   Musculoskeletal:      Cervical back: Normal range of motion and neck supple. No rigidity or tenderness.      Right lower leg: No edema.      Left lower leg: No edema.      Comments: Pedal pulses 2+ bilaterally   Lymphadenopathy:      Cervical: No cervical adenopathy.   Skin:     General: Skin is " warm and dry.      Capillary Refill: Capillary refill takes less than 2 seconds.   Neurological:      Mental Status: He is alert.      Cranial Nerves: No cranial nerve deficit.      Sensory: No sensory deficit.      Motor: No weakness.      Gait: Gait normal.      Comments: Conversant    Psychiatric:         Mood and Affect: Mood normal.         Behavior: Behavior normal.

## 2024-09-13 NOTE — ASSESSMENT & PLAN NOTE
Will order cardiovascular screen, annual thyroid screen.  Caregiver Bianca Hurtado states that cognitively patient has not had any major changes.  Scored an 8 on the mini cog today.  Orders:    Hemoglobin A1C; Future    Lipid panel; Future    TSH, 3rd generation with Free T4 reflex; Future

## 2024-09-13 NOTE — ASSESSMENT & PLAN NOTE
Uric acid level elevated at 9.6.  Per caregiver patient has had 3 flares in the last year.  Per chart review, there has been 2 documented flares.  Will start allopurinol 100 mg daily.  Counseled patient and caregiver on diet modifications.  Repeat uric acid levels in approximately 2 months.  Caregiver can call if there is another acute gout flare.  Orders:    allopurinol (ZYLOPRIM) 100 mg tablet; Take 1 tablet (100 mg total) by mouth daily    Uric acid; Future

## 2024-09-13 NOTE — PATIENT INSTRUCTIONS
Medicare Preventive Visit Patient Instructions  Thank you for completing your Welcome to Medicare Visit or Medicare Annual Wellness Visit today. Your next wellness visit will be due in one year (9/14/2025).  The screening/preventive services that you may require over the next 5-10 years are detailed below. Some tests may not apply to you based off risk factors and/or age. Screening tests ordered at today's visit but not completed yet may show as past due. Also, please note that scanned in results may not display below.  Preventive Screenings:  Service Recommendations Previous Testing/Comments   Colorectal Cancer Screening  Colonoscopy    Fecal Occult Blood Test (FOBT)/Fecal Immunochemical Test (FIT)  Fecal DNA/Cologuard Test  Flexible Sigmoidoscopy Age: 45-75 years old   Colonoscopy: every 10 years (May be performed more frequently if at higher risk)  OR  FOBT/FIT: every 1 year  OR  Cologuard: every 3 years  OR  Sigmoidoscopy: every 5 years  Screening may be recommended earlier than age 45 if at higher risk for colorectal cancer. Also, an individualized decision between you and your healthcare provider will decide whether screening between the ages of 76-85 would be appropriate. Colonoscopy: Not on file  FOBT/FIT: Not on file  Cologuard: Not on file  Sigmoidoscopy: Not on file          Prostate Cancer Screening Individualized decision between patient and health care provider in men between ages of 55-69   Medicare will cover every 12 months beginning on the day after your 50th birthday PSA: No results in last 5 years     Screening Not Indicated     Hepatitis C Screening Once for adults born between 1945 and 1965  More frequently in patients at high risk for Hepatitis C Hep C Antibody: 02/24/2024    Screening Current   Diabetes Screening 1-2 times per year if you're at risk for diabetes or have pre-diabetes Fasting glucose: 92 mg/dL (2/24/2024)  A1C: 5.5 % (2/24/2024)  Screening Current   Cholesterol Screening Once  every 5 years if you don't have a lipid disorder. May order more often based on risk factors. Lipid panel: 02/24/2024  Screening Not Indicated  History Lipid Disorder      Other Preventive Screenings Covered by Medicare:  Abdominal Aortic Aneurysm (AAA) Screening: covered once if your at risk. You're considered to be at risk if you have a family history of AAA or a male between the age of 65-75 who smoking at least 100 cigarettes in your lifetime.  Lung Cancer Screening: covers low dose CT scan once per year if you meet all of the following conditions: (1) Age 55-77; (2) No signs or symptoms of lung cancer; (3) Current smoker or have quit smoking within the last 15 years; (4) You have a tobacco smoking history of at least 20 pack years (packs per day x number of years you smoked); (5) You get a written order from a healthcare provider.  Glaucoma Screening: covered annually if you're considered high risk: (1) You have diabetes OR (2) Family history of glaucoma OR (3)  aged 50 and older OR (4)  American aged 65 and older  Osteoporosis Screening: covered every 2 years if you meet one of the following conditions: (1) Have a vertebral abnormality; (2) On glucocorticoid therapy for more than 3 months; (3) Have primary hyperparathyroidism; (4) On osteoporosis medications and need to assess response to drug therapy.  HIV Screening: covered annually if you're between the age of 15-65. Also covered annually if you are younger than 15 and older than 65 with risk factors for HIV infection. For pregnant patients, it is covered up to 3 times per pregnancy.    Immunizations:  Immunization Recommendations   Influenza Vaccine Annual influenza vaccination during flu season is recommended for all persons aged >= 6 months who do not have contraindications   Pneumococcal Vaccine   * Pneumococcal conjugate vaccine = PCV13 (Prevnar 13), PCV15 (Vaxneuvance), PCV20 (Prevnar 20)  * Pneumococcal polysaccharide vaccine  = PPSV23 (Pneumovax) Adults 19-63 yo with certain risk factors or if 65+ yo  If never received any pneumonia vaccine: recommend Prevnar 20 (PCV20)  Give PCV20 if previously received 1 dose of PCV13 or PPSV23   Hepatitis B Vaccine 3 dose series if at intermediate or high risk (ex: diabetes, end stage renal disease, liver disease)   Respiratory syncytial virus (RSV) Vaccine - COVERED BY MEDICARE PART D  * RSVPreF3 (Arexvy) CDC recommends that adults 60 years of age and older may receive a single dose of RSV vaccine using shared clinical decision-making (SCDM)   Tetanus (Td) Vaccine - COST NOT COVERED BY MEDICARE PART B Following completion of primary series, a booster dose should be given every 10 years to maintain immunity against tetanus. Td may also be given as tetanus wound prophylaxis.   Tdap Vaccine - COST NOT COVERED BY MEDICARE PART B Recommended at least once for all adults. For pregnant patients, recommended with each pregnancy.   Shingles Vaccine (Shingrix) - COST NOT COVERED BY MEDICARE PART B  2 shot series recommended in those 19 years and older who have or will have weakened immune systems or those 50 years and older     Health Maintenance Due:      Topic Date Due   • HIV Screening  Completed   • Hepatitis C Screening  Completed     Immunizations Due:      Topic Date Due   • COVID-19 Vaccine (3 - 2023-24 season) 09/01/2024   • Influenza Vaccine (1) 09/01/2024     Advance Directives   What are advance directives?  Advance directives are legal documents that state your wishes and plans for medical care. These plans are made ahead of time in case you lose your ability to make decisions for yourself. Advance directives can apply to any medical decision, such as the treatments you want, and if you want to donate organs.   What are the types of advance directives?  There are many types of advance directives, and each state has rules about how to use them. You may choose a combination of any of the  following:  Living will:  This is a written record of the treatment you want. You can also choose which treatments you do not want, which to limit, and which to stop at a certain time. This includes surgery, medicine, IV fluid, and tube feedings.   Durable power of  for healthcare (DPAHC):  This is a written record that states who you want to make healthcare choices for you when you are unable to make them for yourself. This person, called a proxy, is usually a family member or a friend. You may choose more than 1 proxy.  Do not resuscitate (DNR) order:  A DNR order is used in case your heart stops beating or you stop breathing. It is a request not to have certain forms of treatment, such as CPR. A DNR order may be included in other types of advance directives.  Medical directive:  This covers the care that you want if you are in a coma, near death, or unable to make decisions for yourself. You can list the treatments you want for each condition. Treatment may include pain medicine, surgery, blood transfusions, dialysis, IV or tube feedings, and a ventilator (breathing machine).  Values history:  This document has questions about your views, beliefs, and how you feel and think about life. This information can help others choose the care that you would choose.  Why are advance directives important?  An advance directive helps you control your care. Although spoken wishes may be used, it is better to have your wishes written down. Spoken wishes can be misunderstood, or not followed. Treatments may be given even if you do not want them. An advance directive may make it easier for your family to make difficult choices about your care.   Weight Management   Why it is important to manage your weight:  Being overweight increases your risk of health conditions such as heart disease, high blood pressure, type 2 diabetes, and certain types of cancer. It can also increase your risk for osteoarthritis, sleep apnea, and  other respiratory problems. Aim for a slow, steady weight loss. Even a small amount of weight loss can lower your risk of health problems.  How to lose weight safely:  A safe and healthy way to lose weight is to eat fewer calories and get regular exercise. You can lose up about 1 pound a week by decreasing the number of calories you eat by 500 calories each day.   Healthy meal plan for weight management:  A healthy meal plan includes a variety of foods, contains fewer calories, and helps you stay healthy. A healthy meal plan includes the following:  Eat whole-grain foods more often.  A healthy meal plan should contain fiber. Fiber is the part of grains, fruits, and vegetables that is not broken down by your body. Whole-grain foods are healthy and provide extra fiber in your diet. Some examples of whole-grain foods are whole-wheat breads and pastas, oatmeal, brown rice, and bulgur.  Eat a variety of vegetables every day.  Include dark, leafy greens such as spinach, kale, lucie greens, and mustard greens. Eat yellow and orange vegetables such as carrots, sweet potatoes, and winter squash.   Eat a variety of fruits every day.  Choose fresh or canned fruit (canned in its own juice or light syrup) instead of juice. Fruit juice has very little or no fiber.  Eat low-fat dairy foods.  Drink fat-free (skim) milk or 1% milk. Eat fat-free yogurt and low-fat cottage cheese. Try low-fat cheeses such as mozzarella and other reduced-fat cheeses.  Choose meat and other protein foods that are low in fat.  Choose beans or other legumes such as split peas or lentils. Choose fish, skinless poultry (chicken or turkey), or lean cuts of red meat (beef or pork). Before you cook meat or poultry, cut off any visible fat.   Use less fat and oil.  Try baking foods instead of frying them. Add less fat, such as margarine, sour cream, regular salad dressing and mayonnaise to foods. Eat fewer high-fat foods. Some examples of high-fat foods  include french fries, doughnuts, ice cream, and cakes.  Eat fewer sweets.  Limit foods and drinks that are high in sugar. This includes candy, cookies, regular soda, and sweetened drinks.  Exercise:  Exercise at least 30 minutes per day on most days of the week. Some examples of exercise include walking, biking, dancing, and swimming. You can also fit in more physical activity by taking the stairs instead of the elevator or parking farther away from stores. Ask your healthcare provider about the best exercise plan for you.      © Copyright [employer] 2018 Information is for End User's use only and may not be sold, redistributed or otherwise used for commercial purposes. All illustrations and images included in CareNotes® are the copyrighted property of [employer] or [employer]    Medicare Preventive Visit Patient Instructions  Thank you for completing your Welcome to Medicare Visit or Medicare Annual Wellness Visit today. Your next wellness visit will be due in one year (9/14/2025).  The screening/preventive services that you may require over the next 5-10 years are detailed below. Some tests may not apply to you based off risk factors and/or age. Screening tests ordered at today's visit but not completed yet may show as past due. Also, please note that scanned in results may not display below.  Preventive Screenings:  Service Recommendations Previous Testing/Comments   Colorectal Cancer Screening  Colonoscopy    Fecal Occult Blood Test (FOBT)/Fecal Immunochemical Test (FIT)  Fecal DNA/Cologuard Test  Flexible Sigmoidoscopy Age: 45-75 years old   Colonoscopy: every 10 years (May be performed more frequently if at higher risk)  OR  FOBT/FIT: every 1 year  OR  Cologuard: every 3 years  OR  Sigmoidoscopy: every 5 years  Screening may be recommended earlier than age 45 if at higher risk for colorectal cancer. Also, an individualized decision between you and your healthcare provider will decide whether  screening between the ages of 76-85 would be appropriate. Colonoscopy: Not on file  FOBT/FIT: Not on file  Cologuard: Not on file  Sigmoidoscopy: Not on file          Prostate Cancer Screening Individualized decision between patient and health care provider in men between ages of 55-69   Medicare will cover every 12 months beginning on the day after your 50th birthday PSA: No results in last 5 years     Screening Not Indicated     Hepatitis C Screening Once for adults born between 1945 and 1965  More frequently in patients at high risk for Hepatitis C Hep C Antibody: 02/24/2024    Screening Current   Diabetes Screening 1-2 times per year if you're at risk for diabetes or have pre-diabetes Fasting glucose: 92 mg/dL (2/24/2024)  A1C: 5.5 % (2/24/2024)  Screening Current   Cholesterol Screening Once every 5 years if you don't have a lipid disorder. May order more often based on risk factors. Lipid panel: 02/24/2024  Screening Not Indicated  History Lipid Disorder      Other Preventive Screenings Covered by Medicare:  Abdominal Aortic Aneurysm (AAA) Screening: covered once if your at risk. You're considered to be at risk if you have a family history of AAA or a male between the age of 65-75 who smoking at least 100 cigarettes in your lifetime.  Lung Cancer Screening: covers low dose CT scan once per year if you meet all of the following conditions: (1) Age 55-77; (2) No signs or symptoms of lung cancer; (3) Current smoker or have quit smoking within the last 15 years; (4) You have a tobacco smoking history of at least 20 pack years (packs per day x number of years you smoked); (5) You get a written order from a healthcare provider.  Glaucoma Screening: covered annually if you're considered high risk: (1) You have diabetes OR (2) Family history of glaucoma OR (3)  aged 50 and older OR (4)  American aged 65 and older  Osteoporosis Screening: covered every 2 years if you meet one of the following  conditions: (1) Have a vertebral abnormality; (2) On glucocorticoid therapy for more than 3 months; (3) Have primary hyperparathyroidism; (4) On osteoporosis medications and need to assess response to drug therapy.  HIV Screening: covered annually if you're between the age of 15-65. Also covered annually if you are younger than 15 and older than 65 with risk factors for HIV infection. For pregnant patients, it is covered up to 3 times per pregnancy.    Immunizations:  Immunization Recommendations   Influenza Vaccine Annual influenza vaccination during flu season is recommended for all persons aged >= 6 months who do not have contraindications   Pneumococcal Vaccine   * Pneumococcal conjugate vaccine = PCV13 (Prevnar 13), PCV15 (Vaxneuvance), PCV20 (Prevnar 20)  * Pneumococcal polysaccharide vaccine = PPSV23 (Pneumovax) Adults 19-65 yo with certain risk factors or if 65+ yo  If never received any pneumonia vaccine: recommend Prevnar 20 (PCV20)  Give PCV20 if previously received 1 dose of PCV13 or PPSV23   Hepatitis B Vaccine 3 dose series if at intermediate or high risk (ex: diabetes, end stage renal disease, liver disease)   Respiratory syncytial virus (RSV) Vaccine - COVERED BY MEDICARE PART D  * RSVPreF3 (Arexvy) CDC recommends that adults 60 years of age and older may receive a single dose of RSV vaccine using shared clinical decision-making (SCDM)   Tetanus (Td) Vaccine - COST NOT COVERED BY MEDICARE PART B Following completion of primary series, a booster dose should be given every 10 years to maintain immunity against tetanus. Td may also be given as tetanus wound prophylaxis.   Tdap Vaccine - COST NOT COVERED BY MEDICARE PART B Recommended at least once for all adults. For pregnant patients, recommended with each pregnancy.   Shingles Vaccine (Shingrix) - COST NOT COVERED BY MEDICARE PART B  2 shot series recommended in those 19 years and older who have or will have weakened immune systems or those 50 years  and older     Health Maintenance Due:      Topic Date Due   • HIV Screening  Completed   • Hepatitis C Screening  Completed     Immunizations Due:      Topic Date Due   • COVID-19 Vaccine (3 - 2023-24 season) 09/01/2024   • Influenza Vaccine (1) 09/01/2024     Advance Directives   What are advance directives?  Advance directives are legal documents that state your wishes and plans for medical care. These plans are made ahead of time in case you lose your ability to make decisions for yourself. Advance directives can apply to any medical decision, such as the treatments you want, and if you want to donate organs.   What are the types of advance directives?  There are many types of advance directives, and each state has rules about how to use them. You may choose a combination of any of the following:  Living will:  This is a written record of the treatment you want. You can also choose which treatments you do not want, which to limit, and which to stop at a certain time. This includes surgery, medicine, IV fluid, and tube feedings.   Durable power of  for healthcare (DPAHC):  This is a written record that states who you want to make healthcare choices for you when you are unable to make them for yourself. This person, called a proxy, is usually a family member or a friend. You may choose more than 1 proxy.  Do not resuscitate (DNR) order:  A DNR order is used in case your heart stops beating or you stop breathing. It is a request not to have certain forms of treatment, such as CPR. A DNR order may be included in other types of advance directives.  Medical directive:  This covers the care that you want if you are in a coma, near death, or unable to make decisions for yourself. You can list the treatments you want for each condition. Treatment may include pain medicine, surgery, blood transfusions, dialysis, IV or tube feedings, and a ventilator (breathing machine).  Values history:  This document has questions  about your views, beliefs, and how you feel and think about life. This information can help others choose the care that you would choose.  Why are advance directives important?  An advance directive helps you control your care. Although spoken wishes may be used, it is better to have your wishes written down. Spoken wishes can be misunderstood, or not followed. Treatments may be given even if you do not want them. An advance directive may make it easier for your family to make difficult choices about your care.   Weight Management   Why it is important to manage your weight:  Being overweight increases your risk of health conditions such as heart disease, high blood pressure, type 2 diabetes, and certain types of cancer. It can also increase your risk for osteoarthritis, sleep apnea, and other respiratory problems. Aim for a slow, steady weight loss. Even a small amount of weight loss can lower your risk of health problems.  How to lose weight safely:  A safe and healthy way to lose weight is to eat fewer calories and get regular exercise. You can lose up about 1 pound a week by decreasing the number of calories you eat by 500 calories each day.   Healthy meal plan for weight management:  A healthy meal plan includes a variety of foods, contains fewer calories, and helps you stay healthy. A healthy meal plan includes the following:  Eat whole-grain foods more often.  A healthy meal plan should contain fiber. Fiber is the part of grains, fruits, and vegetables that is not broken down by your body. Whole-grain foods are healthy and provide extra fiber in your diet. Some examples of whole-grain foods are whole-wheat breads and pastas, oatmeal, brown rice, and bulgur.  Eat a variety of vegetables every day.  Include dark, leafy greens such as spinach, kale, lucie greens, and mustard greens. Eat yellow and orange vegetables such as carrots, sweet potatoes, and winter squash.   Eat a variety of fruits every day.  Choose  fresh or canned fruit (canned in its own juice or light syrup) instead of juice. Fruit juice has very little or no fiber.  Eat low-fat dairy foods.  Drink fat-free (skim) milk or 1% milk. Eat fat-free yogurt and low-fat cottage cheese. Try low-fat cheeses such as mozzarella and other reduced-fat cheeses.  Choose meat and other protein foods that are low in fat.  Choose beans or other legumes such as split peas or lentils. Choose fish, skinless poultry (chicken or turkey), or lean cuts of red meat (beef or pork). Before you cook meat or poultry, cut off any visible fat.   Use less fat and oil.  Try baking foods instead of frying them. Add less fat, such as margarine, sour cream, regular salad dressing and mayonnaise to foods. Eat fewer high-fat foods. Some examples of high-fat foods include french fries, doughnuts, ice cream, and cakes.  Eat fewer sweets.  Limit foods and drinks that are high in sugar. This includes candy, cookies, regular soda, and sweetened drinks.  Exercise:  Exercise at least 30 minutes per day on most days of the week. Some examples of exercise include walking, biking, dancing, and swimming. You can also fit in more physical activity by taking the stairs instead of the elevator or parking farther away from stores. Ask your healthcare provider about the best exercise plan for you.      © Copyright [employer] 2018 Information is for End User's use only and may not be sold, redistributed or otherwise used for commercial purposes. All illustrations and images included in CareNotes® are the copyrighted property of [employer] Inc[employer] or [employer]

## 2024-09-16 ENCOUNTER — OFFICE VISIT (OUTPATIENT)
Dept: PODIATRY | Facility: CLINIC | Age: 39
End: 2024-09-16
Payer: COMMERCIAL

## 2024-09-16 VITALS — BODY MASS INDEX: 42.14 KG/M2 | WEIGHT: 223 LBS

## 2024-09-16 DIAGNOSIS — B35.1 ONYCHOMYCOSIS: ICD-10-CM

## 2024-09-16 DIAGNOSIS — L60.3 NAIL DYSTROPHY: Primary | ICD-10-CM

## 2024-09-16 PROCEDURE — NCFTCARE PR NON-COVERED FOOT CARE: Performed by: PODIATRIST

## 2024-09-16 NOTE — PROGRESS NOTES
Patient presents with an aide for palliative toenail care.  All toenails are elongated.  Toenails on left foot are thick secondary to onychomycosis.    Treatment consisted of nail trimming.  Explained that due to good circulation, this is not a covered service by insurance.

## 2024-09-24 ENCOUNTER — OFFICE VISIT (OUTPATIENT)
Dept: NEUROLOGY | Facility: CLINIC | Age: 39
End: 2024-09-24
Payer: COMMERCIAL

## 2024-09-24 VITALS
OXYGEN SATURATION: 94 % | BODY MASS INDEX: 40.63 KG/M2 | HEIGHT: 61 IN | TEMPERATURE: 97.4 F | WEIGHT: 215.2 LBS | DIASTOLIC BLOOD PRESSURE: 70 MMHG | HEART RATE: 93 BPM | SYSTOLIC BLOOD PRESSURE: 110 MMHG

## 2024-09-24 DIAGNOSIS — Q90.9 COMPLETE TRISOMY 21 SYNDROME: ICD-10-CM

## 2024-09-24 DIAGNOSIS — Z82.0 FAMILY HISTORY OF ALZHEIMER'S DISEASE: ICD-10-CM

## 2024-09-24 DIAGNOSIS — F79 INTELLECTUAL FUNCTIONING DISABILITY: Primary | ICD-10-CM

## 2024-09-24 PROCEDURE — 99213 OFFICE O/P EST LOW 20 MIN: CPT | Performed by: NURSE PRACTITIONER

## 2024-09-24 NOTE — PROGRESS NOTES
Neurology Ambulatory Visit  Name: Nitesh Frankel       : 1985       MRN: [id number]   Encounter Provider: Alberto Roberts MA   Encounter Date: 2024  Encounter department: [facility] [place]    Assessment and Plan  1. Intellectual functioning disability  2. Complete trisomy 21 syndrome  3. Family history of Alzheimer's disease    Physical exam unchanged at this time  MOCA done today is 10/30 (harder exam than previous MMSE testing). Esther ADL score is stable and there is no concerns from staff.    Facility Instructions:  Let the office know if any change  We discussed likely repeating MRI brain neuroquant in  (10 years after previous) or sooner if needed  Continue care with primary care and continue with efforts to stay active/lose weight      He will Return in about 1 year (around 2025).    History of Present Illness     HPI   Nitesh Frankel is a 38 y.o. male who presents for 1 year follow up. He has no concerns since last year; he has had no hospital visits.     Lab Results   Component Value Date    TZCNWIQN96 425 2024     Lab Results   Component Value Date    FOLATE 13.6 2024     Lab Results   Component Value Date     2017    SODIUM 137 2024    K 4.1 2024     2024    CO2 29 2024    AGAP 7 2024    BUN 20 2024    CREATININE 1.43 (H) 2024    GLUC 92 2024    GLUF 92 2024    CALCIUM 8.7 2024    AST 19 2024    ALT 22 2024    ALKPHOS 55 2024    PROT 6.3 2017    TP 7.3 2024    BILITOT 0.4 2017    TBILI 0.53 2024    EGFR 60 2024     Lab Results   Component Value Date    LDLCALC 182 (H) 2024     Lab Results   Component Value Date    XJE8DXMQRXGF 2.551 2024    TSH 2.810 2018         Previous History:  Past medical history of Downs syndrome, seasonal allergies, hyperlipidemia, cerumen impaction, dry skin, obesity   family history of  Alzheimers disease     MRI brain Neuroquant 2017:  QUANTITATIVE:   Exam Quality: Adequate for volumetric analysis.  Hippocampus  Total hippocampal volume (cc):   7.69    Percentile for similar age:      81th   Lateral ventricular volume (cc):     26.52    Percentile for similar age:     82th   Inferior lateral vent volume (cc):    1.75    Percentile for similar age:     53th   Quantitative conclusions:        Hippocampi:                          Normal volume        Lateral Ventricle Volume:     Normal Volume       Temporal Horn Volume:       Normal Volume    Review of Systems   Constitutional: Negative.    HENT: Negative.     Eyes: Negative.    Respiratory: Negative.     Cardiovascular: Negative.    Gastrointestinal: Negative.    Endocrine: Negative.    Genitourinary: Negative.    Musculoskeletal: Negative.    Skin: Negative.    Allergic/Immunologic: Negative.    Neurological: Negative.    Hematological: Negative.    Psychiatric/Behavioral: Negative.     ROS was reviewed and updated as appropriate    Current Outpatient Medications on File Prior to Visit   Medication Sig Dispense Refill    allopurinol (ZYLOPRIM) 100 mg tablet Take 1 tablet (100 mg total) by mouth daily 90 tablet 1    carbamide peroxide (DEBROX) 6.5 % otic solution Administer 5 drops into both ears daily at bedtime 15 mL 3    Cetirizine HCl 10 MG CAPS Take 1 capsule (10 mg total) by mouth in the morning 90 capsule 1    clotrimazole-betamethasone (LOTRISONE) 1-0.05 % cream Apply topically 2 (two) times a day 30 g 0    fluticasone (FLONASE) 50 mcg/act nasal spray SPRAY 1 SPRAY INTO EACH NOSTRIL EVERY DAY 24 mL 2    ibuprofen (MOTRIN) 600 mg tablet Take 1 tablet (600 mg total) by mouth every 6 (six) hours as needed for mild pain. 30 tablet 0    white petrolatum ointment Apply topically 2 (two) times a day as needed for dry skin 106 g 2    Vitamin D, Ergocalciferol, 09461 units CAPS Take 50,000 Units by mouth once a week (Patient not taking: Reported on  "9/13/2024) 6 capsule 0     No current facility-administered medications on file prior to visit.      Social History     Tobacco Use    Smoking status: Never    Smokeless tobacco: Never   Vaping Use    Vaping status: Never Used   Substance and Sexual Activity    Alcohol use: Not Currently     Comment: social    Drug use: Never    Sexual activity: Never       Objective     /70 (BP Location: Right arm, Patient Position: Sitting, Cuff Size: Standard)   Pulse 93   Temp (!) 97.4 °F (36.3 °C) (Temporal)   Ht 5' 1\" (1.549 m)   Wt 97.6 kg (215 lb 3.2 oz)   SpO2 94%   BMI 40.66 kg/m²      Physical Exam  Vitals reviewed.   Constitutional:       General: He is not in acute distress.     Appearance: He is obese.   HENT:      Head: Normocephalic.      Right Ear: External ear normal.      Left Ear: External ear normal.      Nose: Nose normal.      Mouth/Throat:      Pharynx: Oropharynx is clear.   Eyes:      General:         Right eye: No discharge.         Left eye: No discharge.      Extraocular Movements: Extraocular movements intact.   Cardiovascular:      Rate and Rhythm: Regular rhythm. Tachycardia present.      Pulses: Normal pulses.      Heart sounds: Normal heart sounds.   Pulmonary:      Effort: Pulmonary effort is normal.      Breath sounds: Normal breath sounds.   Abdominal:      General: There is no distension.      Tenderness: There is no abdominal tenderness.   Musculoskeletal:      Cervical back: Normal range of motion.   Skin:     General: Skin is dry.      Capillary Refill: Capillary refill takes less than 2 seconds.   Neurological:      Mental Status: He is alert. Mental status is at baseline.      Motor: Motor strength is normal.     Coordination: Romberg sign negative.   Psychiatric:         Mood and Affect: Mood normal.         Speech: Speech normal.       Neurological Exam  Mental Status  Alert. Oriented to person, place and time. Speech is normal. Language is fluent with no aphasia.  See moca " scanned in.    Cranial Nerves  CN II: Right visual acuity: Counts fingers. Left visual acuity: Counts fingers.  CN III, IV, VI: Extraocular movements intact bilaterally.  CN V: Facial sensation is normal.  CN VII: Full and symmetric facial movement.  CN VIII: Hearing is normal.  CN IX, X: Palate elevates symmetrically  CN XI: Shoulder shrug strength is normal.  CN XII: Tongue midline without atrophy or fasciculations.    Motor  Normal muscle bulk throughout. No abnormal involuntary movements. Strength is 5/5 throughout all four extremities.    Sensory  Light touch is normal in upper and lower extremities.     Coordination  Right: Rapid alternating movement normal.Left: Rapid alternating movement normal.    Gait  Casual gait is normal including stance, stride, and arm swing. Romberg is absent. Able to rise from chair without using arms.

## 2024-10-28 ENCOUNTER — TELEPHONE (OUTPATIENT)
Dept: FAMILY MEDICINE CLINIC | Facility: CLINIC | Age: 39
End: 2024-10-28

## 2024-10-28 DIAGNOSIS — Z12.5 PROSTATE CANCER SCREENING: Primary | ICD-10-CM

## 2024-10-28 NOTE — TELEPHONE ENCOUNTER
Patients care giver called asking for a PSA total be added to his lab work before his appointment in December. With him being a group home patient it is part of the regulations that anyone over the age of 40 needs their prostate examined. She said the blood work would be suitable for the exam. Please review thank you

## 2024-11-01 NOTE — TELEPHONE ENCOUNTER
Patient lives in group home and prostate exam is required for male >40yrs. PSA total is suitable for the exam. Pt called and left a message explaining blood work was ordered and this can be completed before appointment in December.

## 2024-12-10 ENCOUNTER — RA CDI HCC (OUTPATIENT)
Dept: OTHER | Facility: HOSPITAL | Age: 39
End: 2024-12-10

## 2025-01-03 ENCOUNTER — APPOINTMENT (OUTPATIENT)
Dept: LAB | Facility: CLINIC | Age: 40
End: 2025-01-03
Payer: COMMERCIAL

## 2025-01-03 DIAGNOSIS — Z13.1 ENCOUNTER FOR SCREENING FOR DIABETES MELLITUS: ICD-10-CM

## 2025-01-03 DIAGNOSIS — Z12.5 PROSTATE CANCER SCREENING: ICD-10-CM

## 2025-01-03 DIAGNOSIS — Q90.9 COMPLETE TRISOMY 21 SYNDROME: ICD-10-CM

## 2025-01-03 DIAGNOSIS — E78.5 HYPERLIPIDEMIA, UNSPECIFIED HYPERLIPIDEMIA TYPE: ICD-10-CM

## 2025-01-03 DIAGNOSIS — M1A.9XX0 CHRONIC GOUT INVOLVING TOE OF RIGHT FOOT WITHOUT TOPHUS, UNSPECIFIED CAUSE: ICD-10-CM

## 2025-01-03 DIAGNOSIS — E55.9 VITAMIN D DEFICIENCY: ICD-10-CM

## 2025-01-03 DIAGNOSIS — Z13.6 ENCOUNTER FOR SCREENING FOR CARDIOVASCULAR DISORDERS: ICD-10-CM

## 2025-01-03 LAB
25(OH)D3 SERPL-MCNC: 11.5 NG/ML (ref 30–100)
ALBUMIN SERPL BCG-MCNC: 3.5 G/DL (ref 3.5–5)
ALP SERPL-CCNC: 46 U/L (ref 34–104)
ALT SERPL W P-5'-P-CCNC: 18 U/L (ref 7–52)
ANION GAP SERPL CALCULATED.3IONS-SCNC: 7 MMOL/L (ref 4–13)
AST SERPL W P-5'-P-CCNC: 18 U/L (ref 13–39)
BILIRUB SERPL-MCNC: 0.45 MG/DL (ref 0.2–1)
BUN SERPL-MCNC: 13 MG/DL (ref 5–25)
CALCIUM SERPL-MCNC: 8.8 MG/DL (ref 8.4–10.2)
CHLORIDE SERPL-SCNC: 105 MMOL/L (ref 96–108)
CHOLEST SERPL-MCNC: 196 MG/DL (ref ?–200)
CO2 SERPL-SCNC: 30 MMOL/L (ref 21–32)
CREAT SERPL-MCNC: 1.48 MG/DL (ref 0.6–1.3)
EST. AVERAGE GLUCOSE BLD GHB EST-MCNC: 120 MG/DL
GFR SERPL CREATININE-BSD FRML MDRD: 57 ML/MIN/1.73SQ M
GLUCOSE P FAST SERPL-MCNC: 78 MG/DL (ref 65–99)
HBA1C MFR BLD: 5.8 %
HDLC SERPL-MCNC: 42 MG/DL
LDLC SERPL CALC-MCNC: 122 MG/DL (ref 0–100)
NONHDLC SERPL-MCNC: 154 MG/DL
POTASSIUM SERPL-SCNC: 3.8 MMOL/L (ref 3.5–5.3)
PROT SERPL-MCNC: 6.4 G/DL (ref 6.4–8.4)
SODIUM SERPL-SCNC: 142 MMOL/L (ref 135–147)
TRIGL SERPL-MCNC: 161 MG/DL (ref ?–150)
TSH SERPL DL<=0.05 MIU/L-ACNC: 4.1 UIU/ML (ref 0.45–4.5)
URATE SERPL-MCNC: 7.7 MG/DL (ref 3.5–8.5)

## 2025-01-03 PROCEDURE — 83036 HEMOGLOBIN GLYCOSYLATED A1C: CPT

## 2025-01-03 PROCEDURE — 82306 VITAMIN D 25 HYDROXY: CPT

## 2025-01-03 PROCEDURE — 84153 ASSAY OF PSA TOTAL: CPT

## 2025-01-03 PROCEDURE — 84550 ASSAY OF BLOOD/URIC ACID: CPT

## 2025-01-03 PROCEDURE — 36415 COLL VENOUS BLD VENIPUNCTURE: CPT

## 2025-01-03 PROCEDURE — G0103 PSA SCREENING: HCPCS

## 2025-01-03 PROCEDURE — 80061 LIPID PANEL: CPT

## 2025-01-03 PROCEDURE — 80053 COMPREHEN METABOLIC PANEL: CPT

## 2025-01-03 PROCEDURE — 84443 ASSAY THYROID STIM HORMONE: CPT

## 2025-01-06 LAB — MISCELLANEOUS LAB TEST RESULT: NORMAL

## 2025-01-06 NOTE — PROGRESS NOTES
"Name: Nitesh Frankel      : 1985      MRN: [id number]  Encounter Provider: Karen Contreras DO  Encounter Date: 2025   Encounter department: [facility] [place]  :  Assessment & Plan  Abnormal laboratory test result         Prediabetes  A1c 5.8.  Lifestyle counseling provided.       Vitamin D deficiency  Ergocalciferol 50,000 IU weekly for 8 weeks.  Repeat 25 OHD around 7 to 8-week kymberly.  Follow-up around 8 to 9 weeks.  Orders:    Vitamin D, Ergocalciferol, 37773 units CAPS; Take 50,000 Units by mouth once a week    Vitamin D 25 hydroxy; Future    Cerumen debris on tympanic membrane of right ear  Attempted to flush out cerumen.  Tried using 2 full bottles of warm water mixed with hydrogen peroxide.  Partially cleaned out.  Patient has hardened cerumen remaining.  Counseled on Debrox drops.  Asymptomatic.  Follow-up in 8 to 9 weeks.    Orders:    carbamide peroxide (DEBROX) 6.5 % otic solution; Administer 5 drops into both ears daily at bedtime           History of Present Illness     38YO M who presents today for follow-up regarding his lab results & cerumen impaction.       Review of Systems   Constitutional:  Negative for chills, fatigue and fever.   HENT:  Negative for congestion, hearing loss, rhinorrhea and sore throat.    Respiratory:  Negative for cough and shortness of breath.    Cardiovascular:  Negative for chest pain and palpitations.   Gastrointestinal:  Negative for abdominal pain, constipation, diarrhea, nausea and vomiting.   Genitourinary:  Negative for decreased urine volume, difficulty urinating, dysuria, flank pain, frequency, hematuria and urgency.   Skin:  Negative for rash.   Neurological:  Negative for dizziness, light-headedness, numbness and headaches.       Objective   /74 (BP Location: Left arm, Patient Position: Sitting, Cuff Size: Standard)   Pulse 69   Temp 98.2 °F (36.8 °C) (Temporal)   Ht 5' 1\" (1.549 m)   Wt 94.8 kg (209 lb)   SpO2 96%   " BMI 39.49 kg/m²      Physical Exam  Vitals reviewed.   Constitutional:       General: He is not in acute distress.     Appearance: He is not ill-appearing, toxic-appearing or diaphoretic.   HENT:      Head: Normocephalic and atraumatic.      Right Ear: External ear normal. There is impacted cerumen.      Left Ear: Tympanic membrane, ear canal and external ear normal. There is no impacted cerumen.      Nose: Nose normal. No congestion or rhinorrhea.   Eyes:      General:         Right eye: No discharge.         Left eye: No discharge.      Conjunctiva/sclera: Conjunctivae normal.   Cardiovascular:      Rate and Rhythm: Normal rate and regular rhythm.      Pulses: Normal pulses.      Heart sounds: Normal heart sounds. No murmur heard.     No friction rub. No gallop.   Pulmonary:      Effort: Pulmonary effort is normal. No respiratory distress.      Breath sounds: Normal breath sounds. No stridor. No wheezing, rhonchi or rales.   Chest:      Chest wall: No tenderness.   Abdominal:      General: Abdomen is flat. There is no distension.      Palpations: Abdomen is soft. There is no mass.      Tenderness: There is no abdominal tenderness. There is no right CVA tenderness, left CVA tenderness, guarding or rebound.      Hernia: No hernia is present.   Musculoskeletal:      Cervical back: Normal range of motion.      Right lower leg: No edema.      Left lower leg: No edema.   Skin:     General: Skin is warm and dry.      Capillary Refill: Capillary refill takes less than 2 seconds.   Neurological:      Mental Status: He is alert.      Comments: Conversant    Psychiatric:         Mood and Affect: Mood normal.         Behavior: Behavior normal.         Karen Contreras DO  PGY-2  [facility]

## 2025-01-07 ENCOUNTER — TELEPHONE (OUTPATIENT)
Dept: FAMILY MEDICINE CLINIC | Facility: CLINIC | Age: 40
End: 2025-01-07

## 2025-01-07 ENCOUNTER — OFFICE VISIT (OUTPATIENT)
Dept: FAMILY MEDICINE CLINIC | Facility: CLINIC | Age: 40
End: 2025-01-07

## 2025-01-07 VITALS
TEMPERATURE: 98.2 F | HEART RATE: 69 BPM | DIASTOLIC BLOOD PRESSURE: 74 MMHG | HEIGHT: 61 IN | SYSTOLIC BLOOD PRESSURE: 122 MMHG | OXYGEN SATURATION: 96 % | WEIGHT: 209 LBS | BODY MASS INDEX: 39.46 KG/M2

## 2025-01-07 DIAGNOSIS — E55.9 VITAMIN D DEFICIENCY: ICD-10-CM

## 2025-01-07 DIAGNOSIS — R89.9 ABNORMAL LABORATORY TEST RESULT: Primary | ICD-10-CM

## 2025-01-07 DIAGNOSIS — H61.21 CERUMEN DEBRIS ON TYMPANIC MEMBRANE OF RIGHT EAR: ICD-10-CM

## 2025-01-07 DIAGNOSIS — R73.03 PREDIABETES: ICD-10-CM

## 2025-01-07 PROCEDURE — 99213 OFFICE O/P EST LOW 20 MIN: CPT | Performed by: FAMILY MEDICINE

## 2025-01-07 PROCEDURE — 69209 REMOVE IMPACTED EAR WAX UNI: CPT | Performed by: FAMILY MEDICINE

## 2025-01-07 RX ORDER — ERGOCALCIFEROL 1.25 MG/1
50000 CAPSULE ORAL WEEKLY
Qty: 8 CAPSULE | Refills: 0 | Status: SHIPPED | OUTPATIENT
Start: 2025-01-07

## 2025-01-07 NOTE — TELEPHONE ENCOUNTER
Dario called asking for the patients PSA testing due to the patients age being over 40 Yrs of age,Labs to be printed at [facility] also faxed through Epic interface     Grover Bishop  Phone:[phone]  Fax:[phone]

## 2025-01-09 ENCOUNTER — TELEPHONE (OUTPATIENT)
Dept: FAMILY MEDICINE CLINIC | Facility: CLINIC | Age: 40
End: 2025-01-09

## 2025-01-09 DIAGNOSIS — N18.2 CKD (CHRONIC KIDNEY DISEASE) STAGE 2, GFR 60-89 ML/MIN: Primary | ICD-10-CM

## 2025-01-09 NOTE — TELEPHONE ENCOUNTER
Hi there this is Dario Brice. I am the  for Nitesh Sanchez, last name Jostin birthday 1985 calling because he was seen on the 7th and the doctor was provided with the MA 51 document that is to be filled out and it was requested by the office that they are given seven days to fill out this document. I do just want to impart the importance of this being completed as soon as possible because Gaurav's eligibility for potential funding for programming depends upon the document being filled out as swiftly as possible as well as accurately. Additionally, if this could please be faxed to our office as well, the number is . That would be extremely helpful if you would be able to send it there as well. And if you have any questions, please give me a call back. My number is [phone] and I am also listed in his patient portal. Thank you. Bye.    Form was completed, faxed to [phone]. Called Dario rBice advised of completion she will come to office to  form tomorrow.  Original form was placed in the gray  bin, copy was placed in the red clerical folder to be scanned to the chart.

## 2025-01-09 NOTE — TELEPHONE ENCOUNTER
LM for Nitesh (patient) and Bianca (caretaker). Given pt is young and has CKD2-3a based on latest labs, pt may benefit from establishing care with nephrology. Referral placed. Will have a repeat CMP in late February. Left our office phone number should they have any questions.

## 2025-01-17 NOTE — TELEPHONE ENCOUNTER
Dario calling and will be faxing the form  Needs to be updated on the diagnosis for patient to continue funding  #15 needs to include Moderate Intellectual Disability as Primary Diagnosis    Dario will  form once completed    Dario can be reached at [phone]

## 2025-02-21 ENCOUNTER — RA CDI HCC (OUTPATIENT)
Dept: OTHER | Facility: HOSPITAL | Age: 40
End: 2025-02-21

## 2025-02-21 NOTE — PROGRESS NOTES
HCC coding opportunities       Chart reviewed, no opportunity found: CHART REVIEWED, NO OPPORTUNITY FOUND        Patients Insurance     Medicare Insurance: [employer]vantage          This is a reminder to assess ((resolve/update/assess)  all HCC (risk adjustment) codes for the year 2025 as patients KIERAN scores reset to zero with the New year.    Also, just a reminder to please review and assess all other chronic conditions for 2025.

## 2025-02-22 ENCOUNTER — APPOINTMENT (OUTPATIENT)
Dept: LAB | Facility: CLINIC | Age: 40
End: 2025-02-22
Payer: COMMERCIAL

## 2025-02-22 DIAGNOSIS — E55.9 VITAMIN D DEFICIENCY: ICD-10-CM

## 2025-02-22 DIAGNOSIS — R89.9 ABNORMAL LABORATORY TEST RESULT: ICD-10-CM

## 2025-02-22 LAB
25(OH)D3 SERPL-MCNC: 19.7 NG/ML (ref 30–100)
ALBUMIN SERPL BCG-MCNC: 3.6 G/DL (ref 3.5–5)
ALP SERPL-CCNC: 55 U/L (ref 34–104)
ALT SERPL W P-5'-P-CCNC: 21 U/L (ref 7–52)
ANION GAP SERPL CALCULATED.3IONS-SCNC: 4 MMOL/L (ref 4–13)
AST SERPL W P-5'-P-CCNC: 17 U/L (ref 13–39)
BILIRUB SERPL-MCNC: 0.41 MG/DL (ref 0.2–1)
BUN SERPL-MCNC: 16 MG/DL (ref 5–25)
CALCIUM SERPL-MCNC: 8.8 MG/DL (ref 8.4–10.2)
CHLORIDE SERPL-SCNC: 102 MMOL/L (ref 96–108)
CO2 SERPL-SCNC: 34 MMOL/L (ref 21–32)
CREAT SERPL-MCNC: 1.38 MG/DL (ref 0.6–1.3)
GFR SERPL CREATININE-BSD FRML MDRD: 63 ML/MIN/1.73SQ M
GLUCOSE P FAST SERPL-MCNC: 85 MG/DL (ref 65–99)
POTASSIUM SERPL-SCNC: 4 MMOL/L (ref 3.5–5.3)
PROT SERPL-MCNC: 7.1 G/DL (ref 6.4–8.4)
SODIUM SERPL-SCNC: 140 MMOL/L (ref 135–147)

## 2025-02-22 PROCEDURE — 80053 COMPREHEN METABOLIC PANEL: CPT

## 2025-02-22 PROCEDURE — 82306 VITAMIN D 25 HYDROXY: CPT

## 2025-02-22 PROCEDURE — 36415 COLL VENOUS BLD VENIPUNCTURE: CPT

## 2025-02-26 ENCOUNTER — TELEPHONE (OUTPATIENT)
Dept: FAMILY MEDICINE CLINIC | Facility: CLINIC | Age: 40
End: 2025-02-26

## 2025-02-26 ENCOUNTER — OFFICE VISIT (OUTPATIENT)
Dept: FAMILY MEDICINE CLINIC | Facility: CLINIC | Age: 40
End: 2025-02-26

## 2025-02-26 VITALS
BODY MASS INDEX: 40.93 KG/M2 | HEART RATE: 52 BPM | RESPIRATION RATE: 24 BRPM | DIASTOLIC BLOOD PRESSURE: 78 MMHG | TEMPERATURE: 98.4 F | WEIGHT: 216.8 LBS | SYSTOLIC BLOOD PRESSURE: 129 MMHG | HEIGHT: 61 IN | OXYGEN SATURATION: 97 %

## 2025-02-26 DIAGNOSIS — E55.9 VITAMIN D DEFICIENCY: Primary | ICD-10-CM

## 2025-02-26 DIAGNOSIS — H61.23 CERUMEN DEBRIS ON TYMPANIC MEMBRANE OF BOTH EARS: ICD-10-CM

## 2025-02-26 PROCEDURE — 99213 OFFICE O/P EST LOW 20 MIN: CPT | Performed by: FAMILY MEDICINE

## 2025-02-26 PROCEDURE — G2211 COMPLEX E/M VISIT ADD ON: HCPCS | Performed by: FAMILY MEDICINE

## 2025-02-26 NOTE — ASSESSMENT & PLAN NOTE
Patient is here for VitD follow up. Patient prescribed 8 capsules of VitD 15806zb. Reports he has 3 more left. VitD levels increased from 11 to 19.    Plan:  -Finish course of VitD 21702jb  -Start OTC VitD 1000iu after completing higher dose supplements  -Recheck levels in 3 months

## 2025-02-26 NOTE — ASSESSMENT & PLAN NOTE
Patient had cerumen de-impaction on 1/07 and Debrox drops prescribed. Patient reports using drops as prescribed. Denies ear pain today. No concerns. PE positive for mild ear wax. TM visualized and wnl    Plan  -Discontinue Debrox drops   -F/u as needed

## 2025-02-26 NOTE — PROGRESS NOTES
"Name: Nitesh Frankel      : 1985      MRN: [id number]  Encounter Provider: Sukhdeep Marie DO  Encounter Date: 2025   Encounter department: [facility] [place]  :  Assessment & Plan  Vitamin D deficiency  Patient is here for VitD follow up. Patient prescribed 8 capsules of VitD 20471ii. Reports he has 3 more left. VitD levels increased from 11 to 19.    Plan:  -Finish course of VitD 37594wa  -Start OTC VitD 1000iu after completing higher dose supplements  -Recheck levels in 3 months          Cerumen debris on tympanic membrane of both ears  Patient had cerumen de-impaction on  and Debrox drops prescribed. Patient reports using drops as prescribed. Denies ear pain today. No concerns. PE positive for mild ear wax. TM visualized and wnl    Plan  -Discontinue Debrox drops   -F/u as needed         History of Present Illness   40YO M who presents today for follow-up regarding his lab results.      Review of Systems   Constitutional:  Negative for chills and fever.   HENT:  Negative for ear pain and sore throat.    Eyes:  Negative for pain and visual disturbance.   Respiratory:  Negative for cough and shortness of breath.    Cardiovascular:  Negative for chest pain and palpitations.   Gastrointestinal:  Negative for abdominal pain and vomiting.   Genitourinary:  Negative for dysuria and hematuria.   Musculoskeletal:  Negative for arthralgias and back pain.   Skin:  Negative for color change and rash.   Neurological:  Negative for seizures and syncope.   All other systems reviewed and are negative.      Objective   /78   Pulse (!) 52   Temp 98.4 °F (36.9 °C) (Temporal)   Resp (!) 24   Ht 5' 1\" (1.549 m)   Wt 98.3 kg (216 lb 12.8 oz)   SpO2 97%   BMI 40.96 kg/m²      Physical Exam  Vitals and nursing note reviewed.   Constitutional:       General: He is not in acute distress.     Appearance: He is well-developed.   HENT:      Head: Normocephalic and atraumatic.      " Right Ear: Tympanic membrane, ear canal and external ear normal.      Left Ear: Tympanic membrane, ear canal and external ear normal.      Ears:      Comments: B/l ear wax visible  Eyes:      Conjunctiva/sclera: Conjunctivae normal.   Cardiovascular:      Rate and Rhythm: Normal rate and regular rhythm.      Heart sounds: No murmur heard.  Pulmonary:      Effort: Pulmonary effort is normal. No respiratory distress.      Breath sounds: Normal breath sounds.   Abdominal:      Palpations: Abdomen is soft.      Tenderness: There is no abdominal tenderness.   Musculoskeletal:         General: No swelling.      Cervical back: Neck supple.   Skin:     General: Skin is warm and dry.      Capillary Refill: Capillary refill takes less than 2 seconds.   Neurological:      Mental Status: He is alert.   Psychiatric:         Mood and Affect: Mood normal.         Sukhdeep Marie DO  Family Medicine [place] PGY1

## 2025-02-27 ENCOUNTER — RESULTS FOLLOW-UP (OUTPATIENT)
Dept: FAMILY MEDICINE CLINIC | Facility: CLINIC | Age: 40
End: 2025-02-27

## 2025-03-19 ENCOUNTER — OFFICE VISIT (OUTPATIENT)
Dept: NEPHROLOGY | Facility: CLINIC | Age: 40
End: 2025-03-19
Payer: COMMERCIAL

## 2025-03-19 VITALS
BODY MASS INDEX: 40.4 KG/M2 | HEART RATE: 55 BPM | HEIGHT: 61 IN | DIASTOLIC BLOOD PRESSURE: 80 MMHG | WEIGHT: 214 LBS | SYSTOLIC BLOOD PRESSURE: 132 MMHG

## 2025-03-19 DIAGNOSIS — N18.2 CKD (CHRONIC KIDNEY DISEASE) STAGE 2, GFR 60-89 ML/MIN: ICD-10-CM

## 2025-03-19 DIAGNOSIS — M1A.0710 CHRONIC IDIOPATHIC GOUT INVOLVING TOE OF RIGHT FOOT WITHOUT TOPHUS: ICD-10-CM

## 2025-03-19 DIAGNOSIS — E66.812 CLASS 2 OBESITY DUE TO EXCESS CALORIES WITHOUT SERIOUS COMORBIDITY WITH BODY MASS INDEX (BMI) OF 37.0 TO 37.9 IN ADULT: ICD-10-CM

## 2025-03-19 DIAGNOSIS — E66.09 CLASS 2 OBESITY DUE TO EXCESS CALORIES WITHOUT SERIOUS COMORBIDITY WITH BODY MASS INDEX (BMI) OF 37.0 TO 37.9 IN ADULT: ICD-10-CM

## 2025-03-19 DIAGNOSIS — N18.31 STAGE 3A CHRONIC KIDNEY DISEASE (HCC): Primary | ICD-10-CM

## 2025-03-19 PROBLEM — N18.30 CKD (CHRONIC KIDNEY DISEASE) STAGE 3, GFR 30-59 ML/MIN (HCC): Status: ACTIVE | Noted: 2022-08-11

## 2025-03-19 PROCEDURE — 99204 OFFICE O/P NEW MOD 45 MIN: CPT | Performed by: INTERNAL MEDICINE

## 2025-03-19 NOTE — PROGRESS NOTES
"NEPHROLOGY OUTPATIENT CONSULTATION   Nitesh Frankel 39 y.o. male MRN: [id number]    Reason for consultation: CKD    Assessment & Plan  CKD (chronic kidney disease) stage 2, GFR 60-89 ml/min  Chronic kidney disease, stage II/III, baseline creatinine since 2021 1.3-1.5 with a recent creatinine of 1.38, current EGFR 63.    Etiology possibly secondary to \"metabolic syndrome\" development of CKD, gout, obesity.  Recommend screening albumin to creatinine ratio.  VS abdominal ultrasound in 2017 unremarkable.  Class 2 obesity due to excess calories without serious comorbidity with body mass index (BMI) of 37.0 to 37.9 in adult  Recommend gradual weight loss  Consider nutrition consult.    Chronic idiopathic gout involving toe of right foot without tophus  Recent uric acid 7.7, improved.  Goal less than 6.0.  Continue with allopurinol, recommend gradual titration given CKD at 50 mg per titration increase.  BMI 40.0-44.9, adult ([facility])  BMI measured at 40.43  Recommend gradual weight loss  Consider nutritional consultation      Overall renal function remains fairly stable since 2021 again suspecting possibly \"metabolic syndrome\"  Recommend gradual weight loss  Recommend screening albumin to creatinine ratio  Previous abdominal ultrasound without significant renal abnormalities.  Assuming no significant proteinuria can follow-up in 6 months with repeat labs at that time.  If any significant proteinuria noted could consider low-dose ACE/ARB.      HISTORY OF PRESENT ILLNESS:  Nitesh is a 39-year-old male with a past medical history including trisomy 21, obesity, dyslipidemia as well as recent diagnosis of gout.  Patient was accompanied by Bianca Hurtado who has been his caregiver for the last 8 years.    No recent complaints.  Last gout episode was several months ago.  Denies any chest pain shortness of breath or significant swelling.  His appetite has been stable.  Unfortunately has gained some weight over the last several " years.    Recent hospital stays or ER visits.    Review of Systems   Constitutional:  Negative for fatigue.   Respiratory:  Negative for chest tightness and shortness of breath.    Gastrointestinal:  Negative for abdominal distention, abdominal pain, diarrhea, nausea and vomiting.   Genitourinary:  Negative for difficulty urinating, flank pain and hematuria.   Neurological:  Negative for dizziness, syncope and light-headedness.       Pertinent findings of a 10 point review of systems noted above otherwise all others negative    PAST MEDICAL HISTORY:  Past Medical History:   Diagnosis Date    Down's syndrome     Spinal pain        PAST SURGICAL HISTORY:  Past Surgical History:   Procedure Laterality Date    NO PAST SURGERIES      GA REPAIR FIRST ABDOMINAL WALL HERNIA N/A 11/6/2019    Procedure: VENTRAL HERNIA REPAIR;  Surgeon: Jamari Wells MD;  Location: [facility];  Service: General       ALLERGIES:  No Known Allergies    SOCIAL HISTORY:  Social History     Substance and Sexual Activity   Alcohol Use Not Currently    Comment: social     Social History     Substance and Sexual Activity   Drug Use Never     Social History     Tobacco Use   Smoking Status Never   Smokeless Tobacco Never       FAMILY HISTORY:  Family History   Problem Relation Age of Onset    No Known Problems Mother     No Known Problems Father        MEDICATIONS:    Current Outpatient Medications:     carbamide peroxide (DEBROX) 6.5 % otic solution, Administer 5 drops into both ears daily at bedtime, Disp: 15 mL, Rfl: 0    Cetirizine HCl 10 MG CAPS, Take 1 capsule (10 mg total) by mouth in the morning, Disp: 90 capsule, Rfl: 1    clotrimazole-betamethasone (LOTRISONE) 1-0.05 % cream, Apply topically 2 (two) times a day, Disp: 30 g, Rfl: 0    fluticasone (FLONASE) 50 mcg/act nasal spray, SPRAY 1 SPRAY INTO EACH NOSTRIL EVERY DAY, Disp: 24 mL, Rfl: 2    ibuprofen (MOTRIN) 600 mg tablet, Take 1 tablet (600 mg total) by mouth every 6 (six) hours as needed  "for mild pain., Disp: 30 tablet, Rfl: 0    Vitamin D, Ergocalciferol, 34606 units CAPS, Take 50,000 Units by mouth once a week, Disp: 8 capsule, Rfl: 0    white petrolatum ointment, Apply topically 2 (two) times a day as needed for dry skin, Disp: 106 g, Rfl: 2    allopurinol (ZYLOPRIM) 100 mg tablet, Take 1 tablet (100 mg total) by mouth daily, Disp: 90 tablet, Rfl: 1        PHYSICAL EXAM:  Vitals:    03/19/25 1532   BP: 132/80   BP Location: Left arm   Patient Position: Sitting   Cuff Size: Standard   Pulse: 55   Weight: 97.1 kg (214 lb)   Height: 5' 1\" (1.549 m)       Physical Exam  Constitutional:       Appearance: He is obese. He is not ill-appearing.   Eyes:      General: No scleral icterus.  Cardiovascular:      Rate and Rhythm: Normal rate and regular rhythm.   Pulmonary:      Effort: Pulmonary effort is normal.      Breath sounds: Normal breath sounds.   Abdominal:      General: There is no distension.      Palpations: Abdomen is soft.      Tenderness: There is no abdominal tenderness. There is no guarding.   Musculoskeletal:         General: No deformity.      Right lower leg: No edema.      Left lower leg: No edema.   Skin:     General: Skin is warm and dry.      Coloration: Skin is not jaundiced.      Findings: No rash.   Neurological:      Mental Status: He is alert. Mental status is at baseline.           Laboratory results:   Results for orders placed or performed in visit on 02/22/25   Vitamin D 25 hydroxy   Result Value Ref Range    Vit D, 25-Hydroxy 19.7 (L) 30.0 - 100.0 ng/mL   Comprehensive metabolic panel   Result Value Ref Range    Sodium 140 135 - 147 mmol/L    Potassium 4.0 3.5 - 5.3 mmol/L    Chloride 102 96 - 108 mmol/L    CO2 34 (H) 21 - 32 mmol/L    ANION GAP 4 4 - 13 mmol/L    BUN 16 5 - 25 mg/dL    Creatinine 1.38 (H) 0.60 - 1.30 mg/dL    Glucose, Fasting 85 65 - 99 mg/dL    Calcium 8.8 8.4 - 10.2 mg/dL    AST 17 13 - 39 U/L    ALT 21 7 - 52 U/L    Alkaline Phosphatase 55 34 - 104 U/L "    Total Protein 7.1 6.4 - 8.4 g/dL    Albumin 3.6 3.5 - 5.0 g/dL    Total Bilirubin 0.41 0.20 - 1.00 mg/dL    eGFR 63 ml/min/1.73sq m

## 2025-03-19 NOTE — ASSESSMENT & PLAN NOTE
"Chronic kidney disease, stage II/III, baseline creatinine since 2021 1.3-1.5 with a recent creatinine of 1.38, current EGFR 63.    Etiology possibly secondary to \"metabolic syndrome\" development of CKD, gout, obesity.  Recommend screening albumin to creatinine ratio.  VS abdominal ultrasound in 2017 unremarkable.  "

## 2025-03-19 NOTE — ASSESSMENT & PLAN NOTE
"BMI measured at 40.43  Recommend gradual weight loss  Consider nutritional consultation      Overall renal function remains fairly stable since 2021 again suspecting possibly \"metabolic syndrome\"  Recommend gradual weight loss  Recommend screening albumin to creatinine ratio  Previous abdominal ultrasound without significant renal abnormalities.  Assuming no significant proteinuria can follow-up in 6 months with repeat labs at that time.  If any significant proteinuria noted could consider low-dose ACE/ARB.    "

## 2025-03-19 NOTE — ASSESSMENT & PLAN NOTE
Recent uric acid 7.7, improved.  Goal less than 6.0.  Continue with allopurinol, recommend gradual titration given CKD at 50 mg per titration increase.

## 2025-03-22 ENCOUNTER — APPOINTMENT (OUTPATIENT)
Dept: LAB | Facility: CLINIC | Age: 40
End: 2025-03-22
Payer: COMMERCIAL

## 2025-03-22 DIAGNOSIS — N18.2 CKD (CHRONIC KIDNEY DISEASE) STAGE 2, GFR 60-89 ML/MIN: ICD-10-CM

## 2025-03-22 DIAGNOSIS — M1A.0710 CHRONIC IDIOPATHIC GOUT INVOLVING TOE OF RIGHT FOOT WITHOUT TOPHUS: ICD-10-CM

## 2025-03-22 DIAGNOSIS — N18.31 STAGE 3A CHRONIC KIDNEY DISEASE (HCC): ICD-10-CM

## 2025-03-22 DIAGNOSIS — E66.812 CLASS 2 OBESITY DUE TO EXCESS CALORIES WITHOUT SERIOUS COMORBIDITY WITH BODY MASS INDEX (BMI) OF 37.0 TO 37.9 IN ADULT: ICD-10-CM

## 2025-03-22 DIAGNOSIS — E66.09 CLASS 2 OBESITY DUE TO EXCESS CALORIES WITHOUT SERIOUS COMORBIDITY WITH BODY MASS INDEX (BMI) OF 37.0 TO 37.9 IN ADULT: ICD-10-CM

## 2025-03-22 LAB
BACTERIA UR QL AUTO: ABNORMAL /HPF
BILIRUB UR QL STRIP: NEGATIVE
CLARITY UR: CLEAR
COLOR UR: ABNORMAL
CREAT UR-MCNC: 106.7 MG/DL
GLUCOSE UR STRIP-MCNC: NEGATIVE MG/DL
HGB UR QL STRIP.AUTO: NEGATIVE
KETONES UR STRIP-MCNC: NEGATIVE MG/DL
LEUKOCYTE ESTERASE UR QL STRIP: NEGATIVE
MICROALBUMIN UR-MCNC: 791.1 MG/L
MICROALBUMIN/CREAT 24H UR: 741 MG/G CREATININE (ref 0–30)
NITRITE UR QL STRIP: NEGATIVE
NON-SQ EPI CELLS URNS QL MICRO: ABNORMAL /HPF
PH UR STRIP.AUTO: 6 [PH]
PROT UR STRIP-MCNC: ABNORMAL MG/DL
RBC #/AREA URNS AUTO: ABNORMAL /HPF
SP GR UR STRIP.AUTO: 1.02 (ref 1–1.03)
UROBILINOGEN UR STRIP-ACNC: <2 MG/DL
WBC #/AREA URNS AUTO: ABNORMAL /HPF

## 2025-03-22 PROCEDURE — 82043 UR ALBUMIN QUANTITATIVE: CPT

## 2025-03-22 PROCEDURE — 81001 URINALYSIS AUTO W/SCOPE: CPT

## 2025-03-22 PROCEDURE — 82570 ASSAY OF URINE CREATININE: CPT

## 2025-03-28 PROBLEM — H61.23 CERUMEN DEBRIS ON TYMPANIC MEMBRANE OF BOTH EARS: Status: RESOLVED | Noted: 2024-03-18 | Resolved: 2025-03-28

## 2025-04-01 DIAGNOSIS — R80.9 MICROALBUMINURIA: Primary | ICD-10-CM

## 2025-04-01 RX ORDER — LOSARTAN POTASSIUM 25 MG/1
25 TABLET ORAL DAILY
Qty: 90 TABLET | Refills: 2 | Status: SHIPPED | OUTPATIENT
Start: 2025-04-01

## 2025-04-01 NOTE — PROGRESS NOTES
Reviewed recent laboratory studies, albumin creatinine ratio 741.  Previous creatinine fairly stable at 1.38.  Recommend low-dose angiotensin receptor blocker, losartan 25 mg daily  Repeat laboratory studies in 2 to 4 weeks.

## 2025-04-09 DIAGNOSIS — E55.9 VITAMIN D DEFICIENCY: ICD-10-CM

## 2025-04-14 DIAGNOSIS — E55.9 VITAMIN D DEFICIENCY: Primary | ICD-10-CM

## 2025-04-14 RX ORDER — ERGOCALCIFEROL 1.25 MG/1
50000 CAPSULE, LIQUID FILLED ORAL WEEKLY
Qty: 8 CAPSULE | Refills: 0 | OUTPATIENT
Start: 2025-04-14

## 2025-04-22 ENCOUNTER — PROCEDURE VISIT (OUTPATIENT)
Dept: PODIATRY | Facility: CLINIC | Age: 40
End: 2025-04-22

## 2025-04-22 VITALS — BODY MASS INDEX: 40.43 KG/M2 | WEIGHT: 214 LBS

## 2025-04-22 DIAGNOSIS — B35.1 ONYCHOMYCOSIS: Primary | ICD-10-CM

## 2025-04-22 DIAGNOSIS — L60.3 NAIL DYSTROPHY: ICD-10-CM

## 2025-04-22 PROCEDURE — NCFTCARE PR NON-COVERED FOOT CARE: Performed by: PODIATRIST

## 2025-04-22 NOTE — PROGRESS NOTES
Patient presents for palliative toenail care.  All toenails are elongated and nails on the left foot are mycotic.  Pedal pulses are palpable.  Treatment consists of nail trimming.

## 2025-05-19 ENCOUNTER — TELEPHONE (OUTPATIENT)
Age: 40
End: 2025-05-19

## 2025-05-19 DIAGNOSIS — M10.9 GOUT OF LEFT FOOT, UNSPECIFIED CAUSE, UNSPECIFIED CHRONICITY: Primary | ICD-10-CM

## 2025-05-19 RX ORDER — METHYLPREDNISOLONE 4 MG/1
TABLET ORAL
Qty: 21 TABLET | Refills: 0 | Status: SHIPPED | OUTPATIENT
Start: 2025-05-19

## 2025-05-19 NOTE — TELEPHONE ENCOUNTER
Caller: Bianca Hurtado/caregiver    Doctor and/or Office: Dr. Richardson/[facility]    #: [phone]    Escalation: Medication/Nitesh is having a flare up of gout-was told to call office when this happens and Dr. Richardson would send an RX for him. Let Bianca know  Is OOO until tomm PM, so please call her back to let her know Dr sent the RX as requested. Thanks

## 2025-06-21 DIAGNOSIS — M1A.9XX0 CHRONIC GOUT INVOLVING TOE OF RIGHT FOOT WITHOUT TOPHUS, UNSPECIFIED CAUSE: ICD-10-CM

## 2025-06-23 RX ORDER — ALLOPURINOL 100 MG/1
100 TABLET ORAL DAILY
Qty: 90 TABLET | Refills: 1 | Status: SHIPPED | OUTPATIENT
Start: 2025-06-23

## 2025-07-29 ENCOUNTER — PATIENT MESSAGE (OUTPATIENT)
Dept: FAMILY MEDICINE CLINIC | Facility: CLINIC | Age: 40
End: 2025-07-29

## 2025-07-29 DIAGNOSIS — J30.2 ACUTE SEASONAL ALLERGIC RHINITIS: ICD-10-CM

## 2025-07-30 DIAGNOSIS — J30.2 ACUTE SEASONAL ALLERGIC RHINITIS: ICD-10-CM

## 2025-07-30 RX ORDER — FLUTICASONE PROPIONATE 50 MCG
1 SPRAY, SUSPENSION (ML) NASAL DAILY PRN
Qty: 24 ML | Refills: 2 | Status: SHIPPED | OUTPATIENT
Start: 2025-07-30

## 2025-08-08 ENCOUNTER — TELEPHONE (OUTPATIENT)
Dept: NEUROLOGY | Facility: CLINIC | Age: 40
End: 2025-08-08

## (undated) DEVICE — BETHLEHEM UNIVERSAL MINOR GEN: Brand: CARDINAL HEALTH

## (undated) DEVICE — VIAL DECANTER

## (undated) DEVICE — ADHESIVE SKIN HIGH VISCOSITY EXOFIN 1ML

## (undated) DEVICE — DRAPE EQUIPMENT RF WAND

## (undated) DEVICE — PAD GROUNDING ADULT

## (undated) DEVICE — SUT VICRYL 1 CT-1 27 IN J261H

## (undated) DEVICE — SUT VICRYL 2-0 REEL 54 IN J286G

## (undated) DEVICE — SUT MONOCRYL 4-0 PS-2 18 IN Y496G

## (undated) DEVICE — VIOLET BRAIDED (POLYGLACTIN 910), SYNTHETIC ABSORBABLE SUTURE: Brand: COATED VICRYL

## (undated) DEVICE — NEEDLE HYPO 22G X 1-1/2 IN

## (undated) DEVICE — PENCIL ELECTROSURG E-Z CLEAN -0035H

## (undated) DEVICE — CHLORAPREP HI-LITE 26ML ORANGE

## (undated) DEVICE — LIGHT HANDLE COVER SLEEVE DISP BLUE STELLAR

## (undated) DEVICE — GLOVE SRG BIOGEL ECLIPSE 7

## (undated) DEVICE — INTENDED FOR TISSUE SEPARATION, AND OTHER PROCEDURES THAT REQUIRE A SHARP SURGICAL BLADE TO PUNCTURE OR CUT.: Brand: BARD-PARKER SAFETY BLADES SIZE 15, STERILE